# Patient Record
Sex: FEMALE | Race: WHITE | Employment: OTHER | URBAN - METROPOLITAN AREA
[De-identification: names, ages, dates, MRNs, and addresses within clinical notes are randomized per-mention and may not be internally consistent; named-entity substitution may affect disease eponyms.]

---

## 2017-05-02 ENCOUNTER — OFFICE VISIT (OUTPATIENT)
Dept: FAMILY MEDICINE CLINIC | Age: 77
End: 2017-05-02

## 2017-05-02 VITALS
SYSTOLIC BLOOD PRESSURE: 130 MMHG | RESPIRATION RATE: 18 BRPM | HEIGHT: 60 IN | HEART RATE: 64 BPM | WEIGHT: 191.8 LBS | DIASTOLIC BLOOD PRESSURE: 80 MMHG | BODY MASS INDEX: 37.66 KG/M2

## 2017-05-02 DIAGNOSIS — Z00.00 MEDICARE ANNUAL WELLNESS VISIT, SUBSEQUENT: Primary | ICD-10-CM

## 2017-05-02 DIAGNOSIS — N18.30 CHRONIC RENAL FAILURE, STAGE 3 (MODERATE) (HCC): ICD-10-CM

## 2017-05-02 DIAGNOSIS — E21.0 HYPERPARATHYROIDISM, PRIMARY (HCC): ICD-10-CM

## 2017-05-02 DIAGNOSIS — I10 ESSENTIAL HYPERTENSION: ICD-10-CM

## 2017-05-02 PROCEDURE — 99999 PR OFFICE/OUTPT VISIT,PROCEDURE ONLY: CPT | Performed by: INTERNAL MEDICINE

## 2017-05-02 RX ORDER — CINACALCET HYDROCHLORIDE 30 MG/1
1 TABLET, COATED ORAL DAILY
COMMUNITY
Start: 2017-04-05

## 2017-05-02 RX ORDER — NAPROXEN 250 MG/1
250 TABLET ORAL 2 TIMES DAILY WITH MEALS
COMMUNITY

## 2017-05-02 RX ORDER — MULTIVIT-MIN/IRON/FOLIC ACID/K 18-600-40
CAPSULE ORAL
COMMUNITY
End: 2021-04-06

## 2017-05-02 RX ORDER — AMLODIPINE BESYLATE 5 MG/1
5 TABLET ORAL 2 TIMES DAILY
Qty: 60 TABLET | Refills: 5 | Status: SHIPPED | OUTPATIENT
Start: 2017-05-02 | End: 2021-10-08 | Stop reason: SDUPTHER

## 2017-05-02 ASSESSMENT — LIFESTYLE VARIABLES: HOW OFTEN DO YOU HAVE A DRINK CONTAINING ALCOHOL: 0

## 2017-05-02 ASSESSMENT — ENCOUNTER SYMPTOMS
EYES NEGATIVE: 1
GASTROINTESTINAL NEGATIVE: 1
BACK PAIN: 0
RESPIRATORY NEGATIVE: 1
ALLERGIC/IMMUNOLOGIC NEGATIVE: 1

## 2017-05-02 ASSESSMENT — ANXIETY QUESTIONNAIRES: GAD7 TOTAL SCORE: 0

## 2017-05-02 ASSESSMENT — PATIENT HEALTH QUESTIONNAIRE - PHQ9: SUM OF ALL RESPONSES TO PHQ QUESTIONS 1-9: 0

## 2018-09-26 PROBLEM — Z00.00 MEDICARE ANNUAL WELLNESS VISIT, SUBSEQUENT: Status: RESOLVED | Noted: 2017-05-02 | Resolved: 2018-09-26

## 2019-08-13 ENCOUNTER — HOSPITAL ENCOUNTER (OUTPATIENT)
Dept: GENERAL RADIOLOGY | Age: 79
Discharge: HOME | End: 2019-08-13
Payer: COMMERCIAL

## 2019-08-13 DIAGNOSIS — Z78.0 ASYMPTOMATIC POSTMENOPAUSAL STATUS (AGE-RELATED) (NATURAL): ICD-10-CM

## 2019-08-13 PROCEDURE — 77080 DXA BONE DENSITY AXIAL: CPT

## 2020-08-14 ENCOUNTER — TELEPHONE (OUTPATIENT)
Dept: ORTHOPEDIC SURGERY | Age: 80
End: 2020-08-14

## 2020-08-14 NOTE — TELEPHONE ENCOUNTER
08/14/2020  EUFLEXXA  (SERIES OF 3)   BILATERAL KNEES. NO AUTHORIZATION REQUIRED. CAN BUY& BILL. MELODY FOLLOWS MEDICARE GUIDELINES.  AP

## 2020-09-14 ENCOUNTER — OFFICE VISIT (OUTPATIENT)
Dept: ORTHOPEDIC SURGERY | Age: 80
End: 2020-09-14
Payer: COMMERCIAL

## 2020-09-14 VITALS — HEIGHT: 60 IN | BODY MASS INDEX: 37.5 KG/M2 | WEIGHT: 191 LBS

## 2020-09-14 PROCEDURE — 20610 DRAIN/INJ JOINT/BURSA W/O US: CPT | Performed by: ORTHOPAEDIC SURGERY

## 2020-09-14 RX ORDER — HYALURONATE SODIUM 10 MG/ML
40 SYRINGE (ML) INTRAARTICULAR ONCE
Status: COMPLETED | OUTPATIENT
Start: 2020-09-14 | End: 2020-09-14

## 2020-09-14 RX ADMIN — Medication 40 MG: at 15:03

## 2020-09-18 ENCOUNTER — OFFICE VISIT (OUTPATIENT)
Dept: ORTHOPEDIC SURGERY | Age: 80
End: 2020-09-18
Payer: COMMERCIAL

## 2020-09-18 PROCEDURE — 20610 DRAIN/INJ JOINT/BURSA W/O US: CPT | Performed by: ORTHOPAEDIC SURGERY

## 2020-09-18 RX ORDER — HYALURONATE SODIUM 10 MG/ML
40 SYRINGE (ML) INTRAARTICULAR ONCE
Status: COMPLETED | OUTPATIENT
Start: 2020-09-18 | End: 2020-09-18

## 2020-09-18 RX ADMIN — Medication 40 MG: at 11:45

## 2020-10-05 ENCOUNTER — OFFICE VISIT (OUTPATIENT)
Dept: ORTHOPEDIC SURGERY | Age: 80
End: 2020-10-05
Payer: COMMERCIAL

## 2020-10-05 VITALS — BODY MASS INDEX: 37.5 KG/M2 | WEIGHT: 191 LBS | HEIGHT: 60 IN

## 2020-10-05 PROCEDURE — 20610 DRAIN/INJ JOINT/BURSA W/O US: CPT | Performed by: ORTHOPAEDIC SURGERY

## 2020-10-05 RX ORDER — HYALURONATE SODIUM 10 MG/ML
40 SYRINGE (ML) INTRAARTICULAR ONCE
Status: COMPLETED | OUTPATIENT
Start: 2020-10-05 | End: 2020-10-05

## 2020-10-05 RX ADMIN — Medication 40 MG: at 13:16

## 2020-10-05 NOTE — PROGRESS NOTES
The patient returns today for their third Euflexxa injection to the left and right knees for diagnosis of osteoarthritis. . The risks, benefits, and complications of the injections were again discussed in detail with the patient. The risks discussed included but are not limited to infection, skin reactions, hot swollen joints, and anaphylaxis. The patient gave verbal informed consent for the injection. The patient skin was prepped with iodine and sterile 4x4 gauze pad and the left and right knee joints were injected with 2 ml of Euflexxa intra-articularly under sterile conditions  into the supra-lateral pouch. The patient tolerated the injection reasonably well. The patient was given instructions to ice the left and right knee and avoid strenuous activities for 24-48 hours. The patient was instructed to call the office immediately if there is increased pain, redness, warmth, fever, or chills. We will see the patient back in 3 months or as needed for follow up. It was suggested that she might be a candidate for medial  brace and she was going to look into that. She thought was a good idea. We will see if this gets approved by her insurance and then get that worked out with her advanced age medial degenerative joint disease.

## 2020-10-06 ENCOUNTER — TELEPHONE (OUTPATIENT)
Dept: ORTHOPEDIC SURGERY | Age: 80
End: 2020-10-06

## 2020-10-06 NOTE — TELEPHONE ENCOUNTER
10/6/20 DME     NO PRECERT REQUIRED     DEDUCTIBLE: NONE                             CO INSURANCE: 80/20    OOP: $4900 /$239.38 MET               PER DILLAN @ [facility]  REF # [id number]  MP

## 2021-03-05 ENCOUNTER — IMMUNIZATION (OUTPATIENT)
Dept: PRIMARY CARE CLINIC | Age: 81
End: 2021-03-05
Payer: COMMERCIAL

## 2021-03-05 PROCEDURE — 91300 COVID-19, PFIZER VACCINE 30MCG/0.3ML DOSE: CPT | Performed by: FAMILY MEDICINE

## 2021-03-05 PROCEDURE — 0001A COVID-19, PFIZER VACCINE 30MCG/0.3ML DOSE: CPT | Performed by: FAMILY MEDICINE

## 2021-03-26 ENCOUNTER — IMMUNIZATION (OUTPATIENT)
Dept: PRIMARY CARE CLINIC | Age: 81
End: 2021-03-26
Payer: COMMERCIAL

## 2021-03-26 PROCEDURE — 91300 COVID-19, PFIZER VACCINE 30MCG/0.3ML DOSE: CPT | Performed by: FAMILY MEDICINE

## 2021-03-26 PROCEDURE — 0002A COVID-19, PFIZER VACCINE 30MCG/0.3ML DOSE: CPT | Performed by: FAMILY MEDICINE

## 2021-04-06 ENCOUNTER — OFFICE VISIT (OUTPATIENT)
Dept: FAMILY MEDICINE CLINIC | Age: 81
End: 2021-04-06
Payer: COMMERCIAL

## 2021-04-06 VITALS
HEART RATE: 72 BPM | DIASTOLIC BLOOD PRESSURE: 80 MMHG | BODY MASS INDEX: 34.08 KG/M2 | WEIGHT: 173.6 LBS | RESPIRATION RATE: 16 BRPM | SYSTOLIC BLOOD PRESSURE: 152 MMHG | TEMPERATURE: 97.7 F | HEIGHT: 60 IN | OXYGEN SATURATION: 98 %

## 2021-04-06 DIAGNOSIS — M17.0 PRIMARY OSTEOARTHRITIS OF KNEES, BILATERAL: Chronic | ICD-10-CM

## 2021-04-06 DIAGNOSIS — G47.33 OBSTRUCTIVE SLEEP APNEA SYNDROME: ICD-10-CM

## 2021-04-06 DIAGNOSIS — E21.0 HYPERPARATHYROIDISM, PRIMARY (HCC): ICD-10-CM

## 2021-04-06 DIAGNOSIS — I10 ESSENTIAL HYPERTENSION: Primary | ICD-10-CM

## 2021-04-06 DIAGNOSIS — E78.00 PURE HYPERCHOLESTEROLEMIA: ICD-10-CM

## 2021-04-06 DIAGNOSIS — N18.31 CHRONIC RENAL FAILURE, STAGE 3A (HCC): ICD-10-CM

## 2021-04-06 PROCEDURE — 99214 OFFICE O/P EST MOD 30 MIN: CPT | Performed by: INTERNAL MEDICINE

## 2021-04-06 RX ORDER — TORSEMIDE 10 MG/1
10 TABLET ORAL DAILY
COMMUNITY
End: 2021-12-14 | Stop reason: ALTCHOICE

## 2021-04-06 RX ORDER — ZINC 25 MG
TABLET ORAL
COMMUNITY

## 2021-04-06 SDOH — ECONOMIC STABILITY: INCOME INSECURITY: HOW HARD IS IT FOR YOU TO PAY FOR THE VERY BASICS LIKE FOOD, HOUSING, MEDICAL CARE, AND HEATING?: NOT HARD AT ALL

## 2021-04-06 SDOH — ECONOMIC STABILITY: TRANSPORTATION INSECURITY
IN THE PAST 12 MONTHS, HAS THE LACK OF TRANSPORTATION KEPT YOU FROM MEDICAL APPOINTMENTS OR FROM GETTING MEDICATIONS?: NO

## 2021-04-06 ASSESSMENT — ENCOUNTER SYMPTOMS
EYES NEGATIVE: 1
BACK PAIN: 1
ALLERGIC/IMMUNOLOGIC NEGATIVE: 1
GASTROINTESTINAL NEGATIVE: 1
RESPIRATORY NEGATIVE: 1

## 2021-04-06 ASSESSMENT — PATIENT HEALTH QUESTIONNAIRE - PHQ9
SUM OF ALL RESPONSES TO PHQ QUESTIONS 1-9: 0
1. LITTLE INTEREST OR PLEASURE IN DOING THINGS: 0

## 2021-04-06 NOTE — ASSESSMENT & PLAN NOTE
Mammo: 4/29/2016. No further testing  Pap: 7/20/2016. No further exam  Colonoscopy: 2013. rechx 10 yrs. DEXA: 8/2017  Eye: 9/2020  Hearing: passed in office exam  Immunnization: flu shot in Oct/Nov. Rx for Shingrix.    MMSE: na  Get Up and Go: passed  Tob: nonsmoker/never  ETOH: none  Caffeine: moderate am  Cardiac Risk Assessment: 10% 10 yr risk by [place].  Living will:  no  Medical power of : yes

## 2021-04-06 NOTE — PATIENT INSTRUCTIONS
Annual Physical Exam. Shingrix vaccine soon. Obstructive Sleep Apnea Syndrome. Will refer for retesting. Chronic Renal Failure, Stage 3 (Moderate). To Nephrology. Hyperparathyroidism, primary ([address] Stable w/ meds. Recent labs good. Primary Osteoarthritis of Knees, Bilateral. To ORtho as needed. Essential Hypertension. BP high. BP twice a day for 1 week. Call w/ results. Might need to adjust meds. Hyperlipidemia. Will do labs. Mammo: 4/29/2016. No further testing  Pap: 7/20/2016. No further exam  Colonoscopy: 2013. rechx 10 yrs. DEXA: 8/2017  Eye: 9/2020  Hearing: passed in office exam  Immunnization: flu shot in Oct/Nov. Rx for Shingrix. MMSE: na  Get Up and Go: passed  Tob: nonsmoker/never  ETOH: none  Caffeine: moderate am  Cardiac Risk Assessment: 10% 10 yr risk by [place].  Living will:  no  Medical power of : yes    Patient Education        Low Back Pain: Exercises  Introduction  Here are some examples of exercises for you to try. The exercises may be suggested for a condition or for rehabilitation. Start each exercise slowly. Ease off the exercises if you start to have pain. You will be told when to start these exercises and which ones will work best for you. How to do the exercises  Press-up   1. Lie on your stomach, supporting your body with your forearms. 2. Press your elbows down into the floor to raise your upper back. As you do this, relax your stomach muscles and allow your back to arch without using your back muscles. As your press up, do not let your hips or pelvis come off the floor. 3. Hold for 15 to 30 seconds, then relax. 4. Repeat 2 to 4 times. Alternate arm and leg (bird dog) exercise   Do this exercise slowly. Try to keep your body straight at all times, and do not let one hip drop lower than the other. 1. Start on the floor, on your hands and knees. 2. Tighten your belly muscles.   3. Raise one leg off the floor, and hold it straight out behind you. Be careful not to let your hip drop down, because that will twist your trunk. 4. Hold for about 6 seconds, then lower your leg and switch to the other leg. 5. Repeat 8 to 12 times on each leg. 6. Over time, work up to holding for 10 to 30 seconds each time. 7. If you feel stable and secure with your leg raised, try raising the opposite arm straight out in front of you at the same time. Knee-to-chest exercise   1. Lie on your back with your knees bent and your feet flat on the floor. 2. Bring one knee to your chest, keeping the other foot flat on the floor (or keeping the other leg straight, whichever feels better on your lower back). 3. Keep your lower back pressed to the floor. Hold for at least 15 to 30 seconds. 4. Relax, and lower the knee to the starting position. 5. Repeat with the other leg. Repeat 2 to 4 times with each leg. 6. To get more stretch, put your other leg flat on the floor while pulling your knee to your chest.    Curl-ups   1. Lie on the floor on your back with your knees bent at a 90-degree angle. Your feet should be flat on the floor, about 12 inches from your buttocks. 2. Cross your arms over your chest. If this bothers your neck, try putting your hands behind your neck (not your head), with your elbows spread apart. 3. Slowly tighten your belly muscles and raise your shoulder blades off the floor. 4. Keep your head in line with your body, and do not press your chin to your chest.  5. Hold this position for 1 or 2 seconds, then slowly lower yourself back down to the floor. 6. Repeat 8 to 12 times. Pelvic tilt exercise   1. Lie on your back with your knees bent. 2. \"Brace\" your stomach. This means to tighten your muscles by pulling in and imagining your belly button moving toward your spine. You should feel like your back is pressing to the floor and your hips and pelvis are rocking back. 3. Hold for about 6 seconds while you breathe smoothly.   4. Repeat 8 to 12 times. Heel dig bridging   1. Lie on your back with both knees bent and your ankles bent so that only your heels are digging into the floor. Your knees should be bent about 90 degrees. 2. Then push your heels into the floor, squeeze your buttocks, and lift your hips off the floor until your shoulders, hips, and knees are all in a straight line. 3. Hold for about 6 seconds as you continue to breathe normally, and then slowly lower your hips back down to the floor and rest for up to 10 seconds. 4. Do 8 to 12 repetitions. Hamstring stretch in doorway   1. Lie on your back in a doorway, with one leg through the open door. 2. Slide your leg up the wall to straighten your knee. You should feel a gentle stretch down the back of your leg. 3. Hold the stretch for at least 15 to 30 seconds. Do not arch your back, point your toes, or bend either knee. Keep one heel touching the floor and the other heel touching the wall. 4. Repeat with your other leg. 5. Do 2 to 4 times for each leg. Hip flexor stretch   1. Kneel on the floor with one knee bent and one leg behind you. Place your forward knee over your foot. Keep your other knee touching the floor. 2. Slowly push your hips forward until you feel a stretch in the upper thigh of your rear leg. 3. Hold the stretch for at least 15 to 30 seconds. Repeat with your other leg. 4. Do 2 to 4 times on each side. Wall sit   1. Stand with your back 10 to 12 inches away from a wall. 2. Lean into the wall until your back is flat against it. 3. Slowly slide down until your knees are slightly bent, pressing your lower back into the wall. 4. Hold for about 6 seconds, then slide back up the wall. 5. Repeat 8 to 12 times. Follow-up care is a key part of your treatment and safety. Be sure to make and go to all appointments, and call your doctor if you are having problems.  It's also a good idea to know your test results and keep a list of the medicines you take.  Where can you learn more? Go to [email or web address] and sign in to your [employer] account. Enter Q346 in the [employer] box to learn more about \"Low Back Pain: Exercises. \"     If you do not have an account, please click on the \"Sign Up Now\" link. Current as of: November 16, 2020               Content Version: 12.8  © 2006-2021 Healthwise, Incorporated. Care instructions adapted under license by [facility] ([facility]). If you have questions about a medical condition or this instruction, always ask your healthcare professional. [address] any warranty or liability for your use of this information.

## 2021-04-06 NOTE — PROGRESS NOTES
Subjective:      Patient ID: Niki Arredondo is a 68 y.o. female. HPI  Annual physical exam  Mammo: 4/29/2016. No further testing  Pap: 7/20/2016. No further exam  Colonoscopy: 2013. rechx 10 yrs. DEXA: 8/2017  Eye: 9/2020  Hearing: passed in office exam  Immunnization: flu shot in Oct/Nov. Rx for Shingrix. MMSE: na  Get Up and Go: passed  Tob: nonsmoker/never  ETOH: none  Caffeine: moderate am  Cardiac Risk Assessment: 10% 10 yr risk by [place].  Living will:  no  Medical power of : yes    Essential hypertension  No change in meds, no c/o with meds, no chest pain, SOB, palpatations, or syncope. Home bp was good. Hyperlipidemia  Stable diet and no c/o w/ meds. No labs for yrs. Chronic renal failure, stage 3 (moderate)  No edema, SOB, decreased urination, abdomenal pain, anemia, or fatigue. Hyperparathyroidism, primary ([address]  Ca up, PTH pending. DEXA was ok in 4 yrs ago. sensipar. Primary osteoarthritis of knees, bilateral  Had injections(Eflexxa)x 3. Stable w/ meds. Increased pain. Review of Systems   Constitutional: Positive for fatigue. HENT: Negative. Eyes: Negative. Respiratory: Negative. Cardiovascular: Negative. Gastrointestinal: Negative. Endocrine: Negative. Genitourinary: Negative. Musculoskeletal: Positive for arthralgias, back pain, gait problem, joint swelling and myalgias. Skin: Negative. Allergic/Immunologic: Negative. Hematological: Negative. Psychiatric/Behavioral: Negative. Vitals:    04/06/21 1448 04/06/21 1543   BP: (!) 142/70 (!) 152/80   Pulse: 72    Resp: 16    Temp: 97.7 °F (36.5 °C)    SpO2: 98%    Weight: 173 lb 9.6 oz (78.7 kg)    Height: 5' (1.524 m)      Objective:   Physical Exam  Constitutional:       General: She is not in acute distress. Appearance: She is well-developed. She is not ill-appearing, toxic-appearing or diaphoretic. HENT:      Head: Normocephalic and atraumatic.    Eyes: Conjunctiva/sclera: Conjunctivae normal.      Pupils: Pupils are equal, round, and reactive to light. Neck:      Musculoskeletal: Full passive range of motion without pain, normal range of motion and neck supple. Normal range of motion. No edema, erythema, spinous process tenderness or muscular tenderness. Thyroid: No thyroid mass or thyromegaly. Vascular: Normal carotid pulses. No carotid bruit, hepatojugular reflux or JVD. Trachea: Trachea normal. No tracheal deviation. Cardiovascular:      Rate and Rhythm: Normal rate and regular rhythm. No extrasystoles are present. Chest Wall: PMI is not displaced. Pulses: Normal pulses. No decreased pulses. Carotid pulses are 2+ on the right side and 2+ on the left side. Radial pulses are 2+ on the right side and 2+ on the left side. Femoral pulses are 2+ on the right side and 2+ on the left side. Popliteal pulses are 2+ on the right side and 2+ on the left side. Dorsalis pedis pulses are 2+ on the right side and 2+ on the left side. Posterior tibial pulses are 2+ on the right side and 2+ on the left side. Heart sounds: Normal heart sounds, S1 normal and S2 normal. Heart sounds not distant. No murmur. No friction rub. No gallop. No S3 or S4 sounds. Pulmonary:      Effort: Pulmonary effort is normal. No tachypnea, bradypnea, accessory muscle usage or respiratory distress. Breath sounds: Normal breath sounds. No decreased breath sounds, wheezing, rhonchi or rales. Chest:      Chest wall: No tenderness. Musculoskeletal: Normal range of motion. General: No tenderness. Lymphadenopathy:      Cervical: No cervical adenopathy. Skin:     General: Skin is warm and dry. Coloration: Skin is not pale. Findings: No abrasion, erythema or rash. Nails: There is no clubbing. Neurological:      Mental Status: She is alert and oriented to person, place, and time.  She is not

## 2021-05-03 ENCOUNTER — TELEPHONE (OUTPATIENT)
Dept: FAMILY MEDICINE CLINIC | Age: 81
End: 2021-05-03

## 2021-05-03 NOTE — TELEPHONE ENCOUNTER
Patient called to report her BP readings. She purchased a new cuff, she went off caffiene. BP was in 160's during brothers illness and death.  4/24 138/66 am 138/68 pm,   4/25 132/68 am, 4/26 123/61 am  116/67 pm,   4-27 140/67 am,   4-28 133/63 am  115/71 pm,   4-29  115/61,  4/30  125/60,   5-1 138/62 am  128/67 pm,   5-2 122/65,   5/3  140/72 am.   Patient is no longer taking Atenolol and would like it removed from her medication list.  She called the [facility] for sleep apnea, cannot get in until August 16th. She is requesting a return call after consulting with Dr. Alesha Luna.

## 2021-05-06 PROBLEM — Z00.00 ANNUAL PHYSICAL EXAM: Status: RESOLVED | Noted: 2017-05-02 | Resolved: 2021-05-06

## 2021-09-16 ENCOUNTER — OFFICE VISIT (OUTPATIENT)
Dept: PULMONOLOGY | Age: 81
End: 2021-09-16
Payer: COMMERCIAL

## 2021-09-16 ENCOUNTER — TELEPHONE (OUTPATIENT)
Dept: PULMONOLOGY | Age: 81
End: 2021-09-16

## 2021-09-16 VITALS
TEMPERATURE: 97.8 F | WEIGHT: 160 LBS | SYSTOLIC BLOOD PRESSURE: 140 MMHG | HEART RATE: 77 BPM | HEIGHT: 62 IN | RESPIRATION RATE: 18 BRPM | BODY MASS INDEX: 29.44 KG/M2 | DIASTOLIC BLOOD PRESSURE: 72 MMHG | OXYGEN SATURATION: 97 %

## 2021-09-16 DIAGNOSIS — R06.81 WITNESSED EPISODE OF APNEA: ICD-10-CM

## 2021-09-16 DIAGNOSIS — R06.83 SNORING: ICD-10-CM

## 2021-09-16 DIAGNOSIS — G47.33 MODERATE OBSTRUCTIVE SLEEP APNEA: Primary | ICD-10-CM

## 2021-09-16 DIAGNOSIS — I10 ESSENTIAL HYPERTENSION: ICD-10-CM

## 2021-09-16 PROCEDURE — 99214 OFFICE O/P EST MOD 30 MIN: CPT | Performed by: INTERNAL MEDICINE

## 2021-09-16 ASSESSMENT — SLEEP AND FATIGUE QUESTIONNAIRES
HOW LIKELY ARE YOU TO NOD OFF OR FALL ASLEEP WHEN YOU ARE A PASSENGER IN A CAR FOR AN HOUR WITHOUT A BREAK: 0
ESS TOTAL SCORE: 0
HOW LIKELY ARE YOU TO NOD OFF OR FALL ASLEEP WHILE LYING DOWN TO REST IN THE AFTERNOON WHEN CIRCUMSTANCES PERMIT: 0
HOW LIKELY ARE YOU TO NOD OFF OR FALL ASLEEP WHILE SITTING INACTIVE IN A PUBLIC PLACE: 0
HOW LIKELY ARE YOU TO NOD OFF OR FALL ASLEEP WHILE WATCHING TV: 0
HOW LIKELY ARE YOU TO NOD OFF OR FALL ASLEEP WHILE SITTING AND READING: 0
NECK CIRCUMFERENCE (INCHES): 14
HOW LIKELY ARE YOU TO NOD OFF OR FALL ASLEEP WHILE SITTING AND TALKING TO SOMEONE: 0
HOW LIKELY ARE YOU TO NOD OFF OR FALL ASLEEP IN A CAR, WHILE STOPPED FOR A FEW MINUTES IN TRAFFIC: 0
HOW LIKELY ARE YOU TO NOD OFF OR FALL ASLEEP WHILE SITTING QUIETLY AFTER LUNCH WITHOUT ALCOHOL: 0

## 2021-09-16 NOTE — PROGRESS NOTES
[facility]                                                                  CHIEF COMPLAINT: HOANG     Consulting provider: Nataliia Canales MD      HPI:   Patient was diagnosed with moderate HOANG 12/18/13. Associated with snoring, witnessed apnea, and hypersomnia. Did not want to get CPAP at that time. Better with her chin strap and when sitting up - 75 degrees due to her back pain. + dry mouth upon awakening. Feels overall better and wondering if she still has it. Interesting in knowing whether she has HOANG or not, if she has might consider CPAP therapy. Bedtime 11-11:30 pm and rise time is 7-7:30 am. Sleep onset 20 minutes. No naps. Old records were reviewed and summarized by me. Past Medical History:   Diagnosis Date    Asthma     Diverticulosis     DJD (degenerative joint disease)     thoracic, lumbar    Hyperlipidemia     [facility]par[facility]roidism, unspecified ([facility]     Hypertension     Osteopenia     stopped fosamax 11/2014 after being on for 10years    Pneumonia     Renal artery stenosis (HCC)     Sleep apnea     Spina bifida [facility])        Past Surgical History:        Procedure Laterality Date    ANGIOPLASTY      rt renal artery stenosis    BREAST REDUCTION SURGERY  1997    bilateral    COLONOSCOPY  11/18/2008    COLONOSCOPY  5/14/13    bx    DILATION AND CURETTAGE OF UTERUS         Allergies:  is allergic to adhesive tape. Social History:    TOBACCO:   reports that she has never smoked. She has never used smokeless tobacco.  ETOH:   reports no history of alcohol use.       Family History:       Problem Relation Age of Onset   Melissa Solum Cancer Mother 80        duodenal    Heart Disease Father 78    Hypertension Father     Hypertension Brother     Diabetes Brother     Cancer Brother         type 2    Hypertension Brother     Diabetes Brother     Hypertension Brother     Heart Disease Other         AAA    Asthma Neg Hx  Emphysema Neg Hx        Current Medications:    Current Outpatient Medications:     torsemide (DEMADEX) 10 MG tablet, Take 10 mg by mouth daily, Disp: , Rfl:     Zinc 25 MG TABS, Take by mouth, Disp: , Rfl:     Glucosamine-Chondroit-Vit C-Mn (GLUCOSAMINE CHONDR 1500 COMPLX PO), Take by mouth, Disp: , Rfl:     naproxen (NAPROSYN) 250 MG tablet, Take 250 mg by mouth 2 times daily (with meals), Disp: , Rfl:     SENSIPAR 30 MG tablet, Take 1 tablet by mouth daily, Disp: , Rfl:     amLODIPine (NORVASC) 5 MG tablet, Take 1 tablet by mouth 2 times daily, Disp: 60 tablet, Rfl: 5    Melatonin 5 MG TABS tablet, Take  by mouth., Disp: , Rfl:     enalapril (VASOTEC) 10 MG tablet, Take 2 tablets by mouth daily. Takes 10 mg in morning, 5 mg in pm, Disp: 30 tablet, Rfl:     vitamin E 1000 UNITS capsule, Take 4,000 Units by mouth daily. , Disp: , Rfl:     Ascorbic Acid (VITAMIN C) 500 MG tablet, Take 500 mg by mouth daily. , Disp: , Rfl:     FISH OIL, by Does not apply route., Disp: , Rfl:     aspirin 81 MG EC tablet, Take 81 mg by mouth every 48 hours. , Disp: , Rfl:     BL GLUCOSAMINE-CHONDROITIN PO, Take  by mouth 2 times daily. , Disp: , Rfl:     simvastatin (ZOCOR) 20 MG tablet, Take 20 mg by mouth nightly.  , Disp: , Rfl:       REVIEW OF SYSTEMS:  Constitutional: Negative for fever  HENT: Negative for sore throat  Eyes: Negative for redness   Respiratory: Negative for dyspnea, cough  Cardiovascular: Negative for chest pain  Gastrointestinal: Negative for vomiting, diarrhea   Genitourinary: Negative for hematuria   Musculoskeletal: + arthralgias   Skin: Negative for rash  Neurological: Negative for syncope  Hematological: Negative for adenopathy  Psychiatric/Behavorial: Negative for anxiety      Objective:   PHYSICAL EXAM:    Blood pressure (!) 140/72, pulse 77, temperature 97.8 °F (36.6 °C), resp. rate 18, height 5' 2\" (1.575 m), weight 160 lb (72.6 kg), SpO2 97 %.' on RA  Gen: No distress.  BMI of 29.26  Eyes: PERRL. No sclera icterus. No conjunctival injection. ENT: No discharge. Pharynx clear. Mallampati class IV. Neck: Trachea midline. No obvious mass. Neck circumference 14\"  Resp: No accessory muscle use. No crackles. No wheezes. No rhonchi. No dullness on percussion. Good air entry. CV: Regular rate. Regular rhythm. No murmur or rub. No edema. GI: Non-tender. Non-distended. No hernia. Skin: Warm and dry. No nodule on exposed extremities. Lymph: No cervical LAD. No supraclavicular LAD. M/S: No cyanosis. No joint deformity. No clubbing. Neuro: Awake. Alert. Moves all four extremities. Psych: Oriented x 3. No anxiety. DATA reviewed by me:   HST 12/18/2013 AHI 19. Desaturation to 82%      Assessment:       · Moderate HOANG  · Snoring and witnsessed apnea   · Obesity class  · Essential hypertension      Plan:       Repeat HST evaluate for sleep related breathing disorder. Treatment options were discussed with patient if HST reveals HOANG, including CPAP therapy, oral appliances, upper airway surgery and hypoglossal nerve stimulation. Discussed with patient the pathophysiology of apnea. Sleep hygiene  Avoid sedatives, alcohol and caffeinated drinks at bed time. No driving motorized vehicles or operating heavy machinery while fatigue, drowsy or sleepy. Continue blood pressure medications - treatment of HOANG can lower blood pressure by levels that are clinically significant. Weight loss is also recommended as a long-term intervention. Complications of HOANG if not treated were discussed with patient patient to include systemic hypertension, pulmonary hypertension, cardiovascular morbidities, car accidents and all cause mortality.

## 2021-09-16 NOTE — PATIENT INSTRUCTIONS
Remember to bring all pulmonary medications to your next appointment with the office. Please keep all of your future appointments scheduled by [address], [facility] office. Out of respect for other patients and providers, you may be asked to reschedule your appointment if you arrive later than your scheduled appointment time. Appointments cancelled less than 24hrs in advance will be considered a no show. Patients with three missed appointments within 1 year or four missed appointments within 2 years can be dismissed from the practice. Sleep Hygiene. .. Tips for better sleep. .. Avoid naps. This will ensure you are sleepy at bedtime. If you have to take a nap, sleep less than 1 hour, before 3 pm.  Sleep only when sleepy; this reduces the time you are awake in bed. Regular exercise is recommended to help you deepen your sleep, but not within 4-6 hours of your bedtime. Timing of exercise is important, aim to exercise early in the morning or early afternoon. A light snack may help you fall asleep. Warm milk and foods high in the amino acid tryptophan, such as bananas, may help you to sleep  Be sure to avoid heavy, spicy or sugary foods 4-6 hours before bedtime and avoid at snack time. Stay away from stimulants such as caffeine and nicotine for at least 4-6 hours before bed. Stimulants can interfere with your ability to fall asleep. Caffeine is found in tea, cola, coffee, cocoa and chocolate and is best avoided at bedtime. Nicotine is found in tobacco products. Avoid alcohol 4-6 hours before bedtime. Alcohol has an immediate sleep-inducing effect, after a few hours when alcohol levels fall there is a stimulant or wake-up effect and will cause fragmented sleep. Sleep rituals are important. Give your body clues it is time to slow down and sleep. Examples include; yoga, deep breathing, listen to relaxing music, a hot bath or a few minutes of reading.   Have a fixed bedtime and awakening time, Even on weekends! You will feel better keeping a regular sleep cycle, even if you are retired or not working. Get into your favorite sleep position. If not asleep in 30 minutes, get up and do something boring until you feel sleepy. Remember not to expose yourself to bright lights such as TV, phone or tablet screens. Only use your bed for sleeping. Do not use your bed as an office, workroom or recreation room. Use comfortable bedding. Uncomfortable bedding can prevent good sleep. Ensure your bedroom is quiet and comfortable. A cooler room along with enough blankets to stay warm is recommended. If your room is too noisy, try a white noise machine. If too bright, try black out shades or an eye mask. Dont take worries to bed. Leave worries about work, school etc. behind you when you go to bed. Some people find it helpful to assign a worry period in the evening or late afternoon to write down your worries and get them out of your system. Your home sleep test is scheduled to be picked up at the Sleep center located at [address] [place] [address] on  at  . Address to Sleep Center: [facility] at [address] [place] [address], [address] [place] [address]            Phone: [phone] Fax: [phone]    If you should need to cancel or reschedule your appointment, please call the Sleep Center at [phone] as soon as possible. We ask that you please phone the [facility] Patient Pre-Services ([phone]) at least 3-5 days prior to your sleep study to pre-register. Failing to pre-register may ultimately cause your insurance to not pay for this procedure. Please refrain from or reduce the use of caffeine and/or alcohol prior to your sleep study and avoid napping the day of your study as these will affect the accuracy of your test results.

## 2021-09-16 NOTE — TELEPHONE ENCOUNTER
Tried scheduling 31-90 day appt for patient per her AVS summary. Patient declined to schedule at this time, states she will schedule her fua after completing HST.

## 2021-10-08 ENCOUNTER — OFFICE VISIT (OUTPATIENT)
Dept: FAMILY MEDICINE CLINIC | Age: 81
End: 2021-10-08
Payer: COMMERCIAL

## 2021-10-08 ENCOUNTER — HOSPITAL ENCOUNTER (OUTPATIENT)
Dept: GENERAL RADIOLOGY | Age: 81
Discharge: HOME | End: 2021-10-08
Payer: COMMERCIAL

## 2021-10-08 VITALS
RESPIRATION RATE: 16 BRPM | BODY MASS INDEX: 28.63 KG/M2 | HEART RATE: 62 BPM | OXYGEN SATURATION: 98 % | SYSTOLIC BLOOD PRESSURE: 148 MMHG | HEIGHT: 62 IN | WEIGHT: 155.6 LBS | DIASTOLIC BLOOD PRESSURE: 86 MMHG

## 2021-10-08 DIAGNOSIS — M54.6 THORACIC SPINE PAIN: ICD-10-CM

## 2021-10-08 DIAGNOSIS — M54.6 ACUTE MIDLINE THORACIC BACK PAIN: ICD-10-CM

## 2021-10-08 DIAGNOSIS — G47.33 OBSTRUCTIVE SLEEP APNEA SYNDROME: ICD-10-CM

## 2021-10-08 DIAGNOSIS — M54.6 THORACIC SPINE PAIN: Primary | ICD-10-CM

## 2021-10-08 DIAGNOSIS — I10 ESSENTIAL HYPERTENSION: ICD-10-CM

## 2021-10-08 PROCEDURE — 72070 X-RAY EXAM THORAC SPINE 2VWS: CPT

## 2021-10-08 PROCEDURE — 90694 VACC AIIV4 NO PRSRV 0.5ML IM: CPT | Performed by: INTERNAL MEDICINE

## 2021-10-08 PROCEDURE — 99214 OFFICE O/P EST MOD 30 MIN: CPT | Performed by: INTERNAL MEDICINE

## 2021-10-08 PROCEDURE — G0008 ADMIN INFLUENZA VIRUS VAC: HCPCS | Performed by: INTERNAL MEDICINE

## 2021-10-08 RX ORDER — DULOXETIN HYDROCHLORIDE 30 MG/1
30 CAPSULE, DELAYED RELEASE ORAL DAILY
Qty: 30 CAPSULE | Refills: 3 | Status: SHIPPED | OUTPATIENT
Start: 2021-10-08 | End: 2022-07-07

## 2021-10-08 RX ORDER — AMLODIPINE BESYLATE 5 MG/1
5 TABLET ORAL 2 TIMES DAILY
Qty: 60 TABLET | Refills: 5 | Status: SHIPPED | OUTPATIENT
Start: 2021-10-08 | End: 2021-10-14 | Stop reason: SDUPTHER

## 2021-10-08 ASSESSMENT — ENCOUNTER SYMPTOMS
BACK PAIN: 1
RESPIRATORY NEGATIVE: 1
ALLERGIC/IMMUNOLOGIC NEGATIVE: 1
GASTROINTESTINAL NEGATIVE: 1

## 2021-10-08 NOTE — PATIENT INSTRUCTIONS
A/P:     Acute Midline Thoracic Back Pain. Will do X-ray. Local ice and Aleve. Obstructive Sleep Apnea Syndrome. To do test.     Essential Hypertension. Home BP. Call if > 140/90 after taking anxiety meds for 3 days. Daily BP checks for at least 1 week. Anxiety/Depression. Begin Cymbalta 30 mg once daily. Call if new c/o.             Sam Gibbons MD

## 2021-10-08 NOTE — PROGRESS NOTES
Gosia Xie presents today for   Chief Complaint   Patient presents with    Back Pain    Anxiety        Essential hypertension  No change in meds, no c/o with meds, no chest pain, SOB, palpatations, or syncope. Home bp have been high in 150s. Obstructive sleep apnea syndrome  Tested 2013. Has not treated. Saw Pulmonary recent but hasn't done testing. Acute midline thoracic back pain   Acute pain started ~2 months ago after lifting . Pain right in middle of back. Not much worse w/ movement. Pain 7/10 comes and goes. Hx of Osteopenia. Review of Systems   Constitutional: Positive for fatigue. Respiratory: Negative. Cardiovascular: Negative. Gastrointestinal: Negative. Endocrine: Negative. Genitourinary: Negative. Musculoskeletal: Positive for back pain. Skin: Negative. Allergic/Immunologic: Negative. Neurological: Negative. Hematological: Negative. Vitals:    10/08/21 1528 10/08/21 1624   BP: (!) 152/84 (!) 148/86   Pulse: 62    Resp: 16    SpO2: 98%    Weight: 155 lb 9.6 oz (70.6 kg)    Height: 5' 2\" (1.575 m)          Physical Exam  Constitutional:       General: She is in acute distress. Appearance: She is well-developed. She is not ill-appearing, toxic-appearing or diaphoretic. HENT:      Head: Normocephalic and atraumatic. Eyes:      Extraocular Movements: Extraocular movements intact. Conjunctiva/sclera: Conjunctivae normal.      Pupils: Pupils are equal, round, and reactive to light. Neck:      Thyroid: No thyroid mass or thyromegaly. Vascular: Normal carotid pulses. No carotid bruit, hepatojugular reflux or JVD. Trachea: Trachea normal. No tracheal deviation. Cardiovascular:      Rate and Rhythm: Normal rate and regular rhythm. No extrasystoles are present. Chest Wall: PMI is not displaced. Pulses: Normal pulses. No decreased pulses. Carotid pulses are 2+ on the right side and 2+ on the left side. Radial pulses are 2+ on the right side and 2+ on the left side. Femoral pulses are 2+ on the right side and 2+ on the left side. Popliteal pulses are 2+ on the right side and 2+ on the left side. Dorsalis pedis pulses are 2+ on the right side and 2+ on the left side. Posterior tibial pulses are 2+ on the right side and 2+ on the left side. Heart sounds: Normal heart sounds, S1 normal and S2 normal. Heart sounds not distant. No murmur heard. No friction rub. No gallop. No S3 or S4 sounds. Pulmonary:      Effort: Pulmonary effort is normal. No tachypnea, bradypnea, accessory muscle usage or respiratory distress. Breath sounds: Normal breath sounds. No decreased breath sounds, wheezing, rhonchi or rales. Chest:      Chest wall: No tenderness. Musculoskeletal:         General: Tenderness (palp pain) present. Normal range of motion. Cervical back: Full passive range of motion without pain, normal range of motion and neck supple. No edema, erythema, rigidity or tenderness. No spinous process tenderness or muscular tenderness. Normal range of motion. Right lower leg: No edema. Left lower leg: No edema. Lymphadenopathy:      Cervical: No cervical adenopathy. Skin:     General: Skin is warm and dry. Coloration: Skin is not jaundiced or pale. Findings: No abrasion, bruising, erythema, lesion or rash. Nails: There is no clubbing. Neurological:      General: No focal deficit present. Mental Status: She is alert and oriented to person, place, and time. Mental status is at baseline. She is not disoriented. Cranial Nerves: No cranial nerve deficit. Sensory: No sensory deficit. Motor: No weakness, tremor, atrophy or abnormal muscle tone. Coordination: Coordination normal.      Gait: Gait normal.      Deep Tendon Reflexes: Reflexes are normal and symmetric.    Psychiatric:         Mood and Affect: Mood is anxious and depressed. Speech: Speech normal.         Behavior: Behavior normal.         Thought Content: Thought content normal.         Judgment: Judgment normal.          A/P:     Acute Midline Thoracic Back Pain. Will do X-ray. Local ice and Aleve. Obstructive Sleep Apnea Syndrome. To do test.     Essential Hypertension. Home BP. Call if > 140/90 after taking anxiety meds for 3 days. Daily BP checks for at least 1 week. Anxiety/Depression. Begin Cymbalta 30 mg once daily. Call if new c/o.             Tawny Walker MD

## 2021-10-08 NOTE — PROGRESS NOTES
Vaccine Information Sheet, \"Influenza - Inactivated\"  given to Juan José Perez, or parent/legal guardian of  Juan José Perez and verbalized understanding. Patient responses:    Have you ever had a reaction to a flu vaccine? No  Do you have any current illness? No  Have you ever had [place] Syndrome? No  Do you have a serious allergy to any of the follow: Neomycin, Polymyxin, Thimerosal, eggs or egg products? No    Flu vaccine given per order. Please see immunization tab. Risks and benefits explained. Current VIS given.

## 2021-10-08 NOTE — ASSESSMENT & PLAN NOTE
No change in meds, no c/o with meds, no chest pain, SOB, palpatations, or syncope. Home bp have been high in 150s.

## 2021-10-08 NOTE — ASSESSMENT & PLAN NOTE
Acute pain started ~2 months ago after lifting . Pain right in middle of back. Not much worse w/ movement. Pain 7/10 comes and goes. Hx of Osteopenia.

## 2021-10-13 ENCOUNTER — TELEPHONE (OUTPATIENT)
Dept: FAMILY MEDICINE CLINIC | Age: 81
End: 2021-10-13

## 2021-10-13 NOTE — TELEPHONE ENCOUNTER
Patient said she was advised to call if her BP was over 140/90 for 3 days on anxiety medication. Patient said on Monday her BP was 150/75, Tuesday 160/80, and Wednesday 148/81. Please advise what patient should do next. I did inform her Dr. Torres Shimon was out of the office today.

## 2021-10-14 RX ORDER — AMLODIPINE BESYLATE 10 MG/1
10 TABLET ORAL 2 TIMES DAILY
Qty: 60 TABLET | Refills: 5 | Status: SHIPPED
Start: 2021-10-14 | End: 2021-12-14 | Stop reason: DRUGHIGH

## 2021-11-29 ENCOUNTER — HOSPITAL ENCOUNTER (OUTPATIENT)
Dept: SLEEP CENTER | Age: 81
Discharge: HOME | End: 2021-12-01
Payer: COMMERCIAL

## 2021-11-29 DIAGNOSIS — G47.33 MODERATE OBSTRUCTIVE SLEEP APNEA: ICD-10-CM

## 2021-11-29 DIAGNOSIS — R06.83 SNORING: ICD-10-CM

## 2021-11-29 DIAGNOSIS — R06.81 WITNESSED EPISODE OF APNEA: ICD-10-CM

## 2021-11-29 PROCEDURE — 95806 SLEEP STUDY UNATT&RESP EFFT: CPT

## 2021-12-01 PROCEDURE — 95806 SLEEP STUDY UNATT&RESP EFFT: CPT | Performed by: INTERNAL MEDICINE

## 2021-12-02 ENCOUNTER — TELEPHONE (OUTPATIENT)
Dept: PULMONOLOGY | Age: 81
End: 2021-12-02

## 2021-12-02 DIAGNOSIS — G47.33 OSA (OBSTRUCTIVE SLEEP APNEA): Primary | ICD-10-CM

## 2021-12-14 ENCOUNTER — OFFICE VISIT (OUTPATIENT)
Dept: FAMILY MEDICINE CLINIC | Age: 81
End: 2021-12-14
Payer: COMMERCIAL

## 2021-12-14 VITALS
BODY MASS INDEX: 27.31 KG/M2 | OXYGEN SATURATION: 98 % | SYSTOLIC BLOOD PRESSURE: 126 MMHG | RESPIRATION RATE: 16 BRPM | WEIGHT: 148.4 LBS | HEART RATE: 74 BPM | DIASTOLIC BLOOD PRESSURE: 64 MMHG | HEIGHT: 62 IN

## 2021-12-14 DIAGNOSIS — E78.00 PURE HYPERCHOLESTEROLEMIA: ICD-10-CM

## 2021-12-14 DIAGNOSIS — N18.31 CHRONIC RENAL FAILURE, STAGE 3A (HCC): ICD-10-CM

## 2021-12-14 DIAGNOSIS — M17.0 PRIMARY OSTEOARTHRITIS OF KNEES, BILATERAL: Chronic | ICD-10-CM

## 2021-12-14 DIAGNOSIS — M54.2 NECK PAIN: ICD-10-CM

## 2021-12-14 DIAGNOSIS — R10.31 CHRONIC RLQ PAIN: ICD-10-CM

## 2021-12-14 DIAGNOSIS — I10 ESSENTIAL HYPERTENSION: ICD-10-CM

## 2021-12-14 DIAGNOSIS — G89.29 CHRONIC RLQ PAIN: ICD-10-CM

## 2021-12-14 PROCEDURE — 99214 OFFICE O/P EST MOD 30 MIN: CPT | Performed by: INTERNAL MEDICINE

## 2021-12-14 RX ORDER — AMLODIPINE BESYLATE 5 MG/1
5 TABLET ORAL 2 TIMES DAILY
Qty: 180 TABLET | Refills: 3 | Status: SHIPPED | OUTPATIENT
Start: 2021-12-14

## 2021-12-14 ASSESSMENT — ENCOUNTER SYMPTOMS
EYES NEGATIVE: 1
ALLERGIC/IMMUNOLOGIC NEGATIVE: 1
RESPIRATORY NEGATIVE: 1
GASTROINTESTINAL NEGATIVE: 1

## 2021-12-14 NOTE — PATIENT INSTRUCTIONS
A/P:     Neck Pain. Trapezius spasm, ice and Voltaren gel. To get X-ray if no better. Chronic Rlq Pain. No palp apin or mass. Fiber and fluids. Primary Osteoarthritis of Knees, Bilateral. Voltaren gel. Essential Hypertension. Continue present meds. Home BP checks. Call if>140/90. Improve diet. Avoid caffeine and salt. Call if new c/o w/ meds. Pure Hypercholesterolemia. Lost 40+ lbs. Eating much better. Chronic Renal Failure, Stage 3 (Moderate) (Hcc). To Nephrologist as scheduled.          Juan Mclaughlin MD

## 2021-12-14 NOTE — PROGRESS NOTES
Rashid Kirkland presents today for   Chief Complaint   Patient presents with    Hypertension        Essential hypertension  No change in meds, no c/o with meds, no chest pain, SOB, palpatations, or syncope. Home bp was high, couldn't take 10 mg amlodipine so tolerated 5 mg twice daily. Has gotten better. Pure hypercholesterolemia  Stable diet and no c/o w/ meds. No labs for yrs. Chronic renal failure, stage 3 (moderate)  No edema, SOB, decreased urination, abdomenal pain, anemia, or fatigue. Sees Nephrology in Feb.     Neck pain   Pain since AA 6 yrs ago. Slowly worse over last month. Muscle pain. Chronic RLQ pain   2 months. Pain comes and goes. Some worse after BM. Can occur at anytime. Not related to eating. Primary osteoarthritis of knees, bilateral   Worse w/ weather. Review of Systems   Constitutional: Negative. HENT: Negative. Eyes: Negative. Respiratory: Negative. Cardiovascular: Negative. Gastrointestinal: Negative. Endocrine: Negative. Genitourinary: Negative. Musculoskeletal: Negative. Skin: Negative. Allergic/Immunologic: Negative. Neurological: Negative. Hematological: Negative. Psychiatric/Behavioral: Negative. Vitals:    12/14/21 1432   BP: 126/64   Pulse: 74   Resp: 16   SpO2: 98%   Weight: 148 lb 6.4 oz (67.3 kg)   Height: 5' 2\" (1.575 m)        Physical Exam  Constitutional:       General: She is not in acute distress. Appearance: Normal appearance. She is well-developed. She is obese. She is not ill-appearing, toxic-appearing or diaphoretic. HENT:      Head: Normocephalic and atraumatic. Eyes:      Extraocular Movements: Extraocular movements intact. Conjunctiva/sclera: Conjunctivae normal.      Pupils: Pupils are equal, round, and reactive to light. Neck:      Thyroid: No thyroid mass or thyromegaly. Vascular: Normal carotid pulses. No carotid bruit, hepatojugular reflux or JVD.       Trachea: Trachea normal. No tracheal deviation. Cardiovascular:      Rate and Rhythm: Normal rate and regular rhythm. No extrasystoles are present. Chest Wall: PMI is not displaced. Pulses: Normal pulses. No decreased pulses. Carotid pulses are 2+ on the right side and 2+ on the left side. Radial pulses are 2+ on the right side and 2+ on the left side. Femoral pulses are 2+ on the right side and 2+ on the left side. Popliteal pulses are 2+ on the right side and 2+ on the left side. Dorsalis pedis pulses are 2+ on the right side and 2+ on the left side. Posterior tibial pulses are 2+ on the right side and 2+ on the left side. Heart sounds: Normal heart sounds, S1 normal and S2 normal. Heart sounds not distant. No murmur heard. No friction rub. No gallop. No S3 or S4 sounds. Pulmonary:      Effort: Pulmonary effort is normal. No tachypnea, bradypnea, accessory muscle usage or respiratory distress. Breath sounds: Normal breath sounds. No decreased breath sounds, wheezing, rhonchi or rales. Chest:      Chest wall: No tenderness. Musculoskeletal:         General: Tenderness (Trapezius spasm and OA neck moderate. palp pain and Tendonitis  L shoulder. ) present. No swelling, deformity or signs of injury. Cervical back: Full passive range of motion without pain, normal range of motion and neck supple. No edema, erythema, rigidity or tenderness. No spinous process tenderness or muscular tenderness. Normal range of motion. Right lower leg: No edema. Left lower leg: No edema. Lymphadenopathy:      Cervical: No cervical adenopathy. Skin:     General: Skin is warm and dry. Coloration: Skin is not jaundiced or pale. Findings: No abrasion, bruising, erythema, lesion or rash. Nails: There is no clubbing. Neurological:      General: No focal deficit present. Mental Status: She is alert and oriented to person, place, and time.  Mental status is at baseline. She is not disoriented. Cranial Nerves: No cranial nerve deficit. Sensory: No sensory deficit. Motor: No weakness, tremor, atrophy or abnormal muscle tone. Coordination: Coordination normal.      Gait: Gait normal.      Deep Tendon Reflexes: Reflexes are normal and symmetric. Psychiatric:         Mood and Affect: Mood normal.         Speech: Speech normal.         Behavior: Behavior normal.         Thought Content: Thought content normal.         Judgment: Judgment normal.          A/P:     Neck Pain. Trapezius spasm, ice and Voltaren gel. To get X-ray if no better. Chronic Rlq Pain. No palp apin or mass. Fiber and fluids. Primary Osteoarthritis of Knees, Bilateral. Voltaren gel. Essential Hypertension. Continue present meds. Home BP checks. Call if>140/90. Improve diet. Avoid caffeine and salt. Call if new c/o w/ meds. Pure Hypercholesterolemia. Lost 40+ lbs. Eating much better. Chronic Renal Failure, Stage 3 (Moderate) (Hcc). To Nephrologist as scheduled.          Emmanuel Plasencia MD

## 2021-12-14 NOTE — ASSESSMENT & PLAN NOTE
No change in meds, no c/o with meds, no chest pain, SOB, palpatations, or syncope. Home bp was high, couldn't take 10 mg amlodipine so tolerated 5 mg twice daily. Has gotten better.

## 2021-12-16 ENCOUNTER — HOSPITAL ENCOUNTER (OUTPATIENT)
Dept: SLEEP CENTER | Age: 81
Discharge: HOME | End: 2021-12-18
Payer: COMMERCIAL

## 2021-12-16 DIAGNOSIS — G47.33 OSA (OBSTRUCTIVE SLEEP APNEA): ICD-10-CM

## 2021-12-16 PROCEDURE — 95811 POLYSOM 6/>YRS CPAP 4/> PARM: CPT

## 2021-12-17 ENCOUNTER — HOSPITAL ENCOUNTER (EMERGENCY)
Age: 81
Discharge: HOME | End: 2021-12-17
Attending: EMERGENCY MEDICINE
Payer: COMMERCIAL

## 2021-12-17 ENCOUNTER — APPOINTMENT (OUTPATIENT)
Dept: GENERAL RADIOLOGY | Age: 81
End: 2021-12-17
Payer: COMMERCIAL

## 2021-12-17 VITALS
SYSTOLIC BLOOD PRESSURE: 144 MMHG | HEART RATE: 64 BPM | RESPIRATION RATE: 16 BRPM | HEIGHT: 62 IN | OXYGEN SATURATION: 95 % | BODY MASS INDEX: 27.23 KG/M2 | TEMPERATURE: 98.2 F | DIASTOLIC BLOOD PRESSURE: 70 MMHG | WEIGHT: 148 LBS

## 2021-12-17 DIAGNOSIS — I10 ESSENTIAL HYPERTENSION: ICD-10-CM

## 2021-12-17 DIAGNOSIS — R07.9 CHEST PAIN, UNSPECIFIED TYPE: Primary | ICD-10-CM

## 2021-12-17 LAB
A/G RATIO: 1.9 (ref 1.1–2.2)
ALBUMIN SERPL-MCNC: 4.3 G/DL (ref 3.4–5)
ALP BLD-CCNC: 93 U/L (ref 40–129)
ALT SERPL-CCNC: 34 U/L (ref 10–40)
ANION GAP SERPL CALCULATED.3IONS-SCNC: 9 MMOL/L (ref 3–16)
AST SERPL-CCNC: 32 U/L (ref 15–37)
BASOPHILS ABSOLUTE: 0.1 K/UL (ref 0–0.2)
BASOPHILS RELATIVE PERCENT: 1.3 %
BILIRUB SERPL-MCNC: 0.6 MG/DL (ref 0–1)
BUN BLDV-MCNC: 16 MG/DL (ref 7–20)
CALCIUM SERPL-MCNC: 10.6 MG/DL (ref 8.3–10.6)
CHLORIDE BLD-SCNC: 106 MMOL/L (ref 99–110)
CO2: 26 MMOL/L (ref 21–32)
CREAT SERPL-MCNC: 0.9 MG/DL (ref 0.6–1.2)
EKG ATRIAL RATE: 80 BPM
EKG DIAGNOSIS: NORMAL
EKG P AXIS: 39 DEGREES
EKG P-R INTERVAL: 148 MS
EKG Q-T INTERVAL: 364 MS
EKG QRS DURATION: 96 MS
EKG QTC CALCULATION (BAZETT): 419 MS
EKG R AXIS: -9 DEGREES
EKG T AXIS: 43 DEGREES
EKG VENTRICULAR RATE: 80 BPM
EOSINOPHILS ABSOLUTE: 0.4 K/UL (ref 0–0.6)
EOSINOPHILS RELATIVE PERCENT: 6.4 %
GFR AFRICAN AMERICAN: >60
GFR NON-AFRICAN AMERICAN: >60
GLUCOSE BLD-MCNC: 87 MG/DL (ref 70–99)
HCT VFR BLD CALC: 37.6 % (ref 36–48)
HEMOGLOBIN: 12.7 G/DL (ref 12–16)
LYMPHOCYTES ABSOLUTE: 1.3 K/UL (ref 1–5.1)
LYMPHOCYTES RELATIVE PERCENT: 23 %
MCH RBC QN AUTO: 32.2 PG (ref 26–34)
MCHC RBC AUTO-ENTMCNC: 33.7 G/DL (ref 31–36)
MCV RBC AUTO: 95.6 FL (ref 80–100)
MONOCYTES ABSOLUTE: 0.6 K/UL (ref 0–1.3)
MONOCYTES RELATIVE PERCENT: 11.1 %
NEUTROPHILS ABSOLUTE: 3.4 K/UL (ref 1.7–7.7)
NEUTROPHILS RELATIVE PERCENT: 58.2 %
PDW BLD-RTO: 13 % (ref 12.4–15.4)
PLATELET # BLD: 198 K/UL (ref 135–450)
PMV BLD AUTO: 8.2 FL (ref 5–10.5)
POTASSIUM REFLEX MAGNESIUM: 3.9 MMOL/L (ref 3.5–5.1)
RBC # BLD: 3.93 M/UL (ref 4–5.2)
SODIUM BLD-SCNC: 141 MMOL/L (ref 136–145)
TOTAL PROTEIN: 6.6 G/DL (ref 6.4–8.2)
TROPONIN: <0.01 NG/ML
WBC # BLD: 5.8 K/UL (ref 4–11)

## 2021-12-17 PROCEDURE — 93010 ELECTROCARDIOGRAM REPORT: CPT | Performed by: INTERNAL MEDICINE

## 2021-12-17 PROCEDURE — 71046 X-RAY EXAM CHEST 2 VIEWS: CPT

## 2021-12-17 PROCEDURE — 85025 COMPLETE CBC W/AUTO DIFF WBC: CPT

## 2021-12-17 PROCEDURE — 95811 POLYSOM 6/>YRS CPAP 4/> PARM: CPT | Performed by: INTERNAL MEDICINE

## 2021-12-17 PROCEDURE — 99282 EMERGENCY DEPT VISIT SF MDM: CPT

## 2021-12-17 PROCEDURE — 80053 COMPREHEN METABOLIC PANEL: CPT

## 2021-12-17 PROCEDURE — 93005 ELECTROCARDIOGRAM TRACING: CPT | Performed by: EMERGENCY MEDICINE

## 2021-12-17 PROCEDURE — 84484 ASSAY OF TROPONIN QUANT: CPT

## 2021-12-17 ASSESSMENT — PAIN DESCRIPTION - LOCATION: LOCATION: CHEST

## 2021-12-17 ASSESSMENT — PAIN DESCRIPTION - PAIN TYPE: TYPE: ACUTE PAIN

## 2021-12-17 ASSESSMENT — PAIN SCALES - GENERAL: PAINLEVEL_OUTOF10: 5

## 2021-12-17 NOTE — ED PROVIDER NOTES
Emergency Physician Note  [address] [facility] ED  [address] [address]  Dept: [phone]  Loc: [phone]  Open Note Time:  6:31 AM EST    Chief Complaint  Chest Pain (Was in sleep lab when CP started. )       History of Present Illness  Juan José Perez is a 80 y.o. female  has a past medical history of Asthma, Diverticulosis, DJD (degenerative joint disease), Hyperlipidemia, Hyperparathyroidism, unspecified ([address] Hypertension, Osteopenia, Pneumonia, Renal artery stenosis ([address] Sleep apnea, and Spina bifida ([address] who presents to the ED for neck pain. Patient was in the sleep lab she woke up and she had chest pain that was in her left breast, was sharp, rapid was called and I went to evaluate the patient in the sleep lab. She is sitting up in bed, alert and oriented. By the time I reevaluated her in the ER she said the pain has completely resolved. Denies fever,  shortness of breath, cough, abdominal pain, nausea, vomiting, diarrhea, headache,   back pain, rash. No palliative/provocative factors. Unless otherwise stated in this report or unable to obtain because of the patient's clinical or mental status as evidenced by the medical record, this patient's positive and negative responses for review of systems, constitutional, psych, eyes, ENT, cardiovascular, respiratory, gastrointestinal, neurological, genitourinary, musculoskeletal, integument systems and systems related to the presenting problem are either stated in the preceding paragraph or were not pertinent or were negative for the symptoms and/or complaints related to the medical problem. I have reviewed the following from the nursing documentation:      Prior to Admission medications    Medication Sig Start Date End Date Taking?  Authorizing Provider   amLODIPine (NORVASC) 5 MG tablet Take 1 tablet by mouth 2 times daily 12/14/21   COREY Saleh MD   DULoxetine (CYMBALTA) 30 MG extended release capsule Take 1 capsule by mouth daily 10/8/21   COREY Goodrich MD   Zinc 25 MG TABS Take by mouth    Historical Provider, MD   Glucosamine-Chondroit-Vit C-Mn (GLUCOSAMINE CHONDR 1500 COMPLX PO) Take by mouth    Historical Provider, MD   naproxen (NAPROSYN) 250 MG tablet Take 250 mg by mouth 2 times daily (with meals)    Historical Provider, MD   SENSIPAR 30 MG tablet Take 1 tablet by mouth daily 4/5/17   Historical Provider, MD   Melatonin 5 MG TABS tablet Take  by mouth. Historical Provider, MD   enalapril (VASOTEC) 10 MG tablet Take 2 tablets by mouth daily. Takes 10 mg in morning, 5 mg in pm 4/3/14   Hardy Chambers MD   vitamin E 1000 UNITS capsule Take 4,000 Units by mouth daily. Historical Provider, MD   Ascorbic Acid (VITAMIN C) 500 MG tablet Take 500 mg by mouth daily. Historical Provider, MD [place]  by Does not apply route. Patient not taking: Reported on 10/8/2021    Historical Provider, MD   aspirin 81 MG EC tablet Take 81 mg by mouth every 48 hours. Historical Provider, MD   BL GLUCOSAMINE-CHONDROITIN PO Take  by mouth 2 times daily. Historical Provider, MD   simvastatin (ZOCOR) 20 MG tablet Take 20 mg by mouth nightly.       Historical Provider, MD       Allergies as of 12/17/2021 - Fully Reviewed 12/17/2021   Allergen Reaction Noted    Adhesive tape Rash 11/07/2016       Past Medical History:   Diagnosis Date    Asthma     Diverticulosis     DJD (degenerative joint disease)     thoracic, lumbar    Hyperlipidemia     Hyperparathyroidism, unspecified ([address]     Hypertension     Osteopenia     stopped fosamax 11/2014 after being on for 10years    Pneumonia     [facility] artery stenosis ([facility]     Sleep apnea     Spina bifida [facility])         Surgical History:   Past Surgical History:   Procedure Laterality Date    ANGIOPLASTY      rt renal artery stenosis    BREAST REDUCTION SURGERY  1997    bilateral    COLONOSCOPY  11/18/2008    COLONOSCOPY  5/14/13    bx    DILATION AND CURETTAGE OF UTERUS          Family History:    Family History   Problem Relation Age of Onset   Dioni Eason Cancer Mother 80        duodenal    Heart Disease Father 78    Hypertension Father     Hypertension Brother     Diabetes Brother     Cancer Brother         type 2    Hypertension Brother     Diabetes Brother     Hypertension Brother     Heart Disease Other         AAA    Asthma Neg Hx     Emphysema Neg Hx        Social History     Socioeconomic History    Marital status:      Spouse name: Not on file    Number of children: Not on file    Years of education: Not on file    Highest education level: Not on file   Occupational History    Occupation: retired     Comment: occupational health   Tobacco Use    Smoking status: Never Smoker    Smokeless tobacco: Never Used   Substance and Sexual Activity    Alcohol use: No    Drug use: No    Sexual activity: Not on file   Other Topics Concern    Not on file   Social History Narrative    Not on file     Social Determinants of Health     Financial Resource Strain: Low Risk     Difficulty of Paying Living Expenses: Not hard at all   Food Insecurity: No Food Insecurity    Worried About Running Out of Food in the Last Year: Never true    [address] in the Last Year: Never true   Transportation Needs: No Transportation Needs    Lack of Transportation (Medical): No    Lack of Transportation (Non-Medical):  No   Physical Activity:     Days of Exercise per Week: Not on file    Minutes of Exercise per Session: Not on file   Stress:     Feeling of Stress : Not on file   Social Connections:     Frequency of Communication with Friends and Family: Not on file    Frequency of Social Gatherings with Friends and Family: Not on file    Attends Confucianist Services: Not on file    Active Member of Clubs or Organizations: Not on file    Attends Club or Organization Meetings: Not on file    Marital Status: Not on file   Intimate Partner Violence:     Fear of Current or Ex-Partner: Not on file    Emotionally Abused: Not on file    Physically Abused: Not on file    Sexually Abused: Not on file   Housing Stability:     Unable to Pay for Housing in the Last Year: Not on file    Number of Places Lived in the Last Year: Not on file    Unstable Housing in the Last Year: Not on file       Nursing notes reviewed. ED Triage Vitals [12/17/21 0535]   Enc Vitals Group      BP (!) 137/113      Pulse 86      Resp 16      Temp 98.2 °F (36.8 °C)      Temp Source Oral      SpO2 97 %      Weight 148 lb (67.1 kg)      Height 5' 2\" (1.575 m)      Head Circumference       Peak Flow       Pain Score       Pain Loc       Pain Edu? Excl. in [address] GENERAL:   Body mass index is 27.07 kg/m². Awake, alert. Well developed, well nourished with no apparent distress. Nontoxic-appearing, non-ill-appearing. HENT:   Normocephalic, Atraumatic, no lacerations. No ENT exam due to PPE. EYES:   Conjunctiva normal,   Pupils equal round and reactive to light,   Extraocular movements normal.  NECK:  Trachea is midline. No stridor. CHEST:  Regular rate and regular rhythm, no murmurs/rubs/gallops,  normal S1/S2, chest wall non-tender. I did palpate some fibrous changes on her left breast, patient states that she has not had a mammogram for a while. I encouraged her to do so  LUNGS:  No respiratory distress. No abdominal retractions, no sternal retractions  Clear to auscultation bilaterally, no wheezing, no rhochi, no rales  Speaking comfortably in full sentences  ABDOMEN:  Soft, non-tender, non-distended. No guarding. No rebound. No costovertebral angle tenderness to palpation. Normal BS, no organomegaly, no abdominal masses  EXTREMITIES:  Moves extremities x4 with purpose. Normal range of motion, no edema,  No tenderness, no deformity,  distal pulses present and equal bilaterally.   BACK:  No midline tenderness in the cervical, thoracic, and lumbar spine.  No deformities, no step-off. Palpation did not elicit any paraspinous tenderness. SKIN:  Warm, dry and intact. NEUROLOGIC:  Normal mental status. Moving all extremities to command. Alert and oriented x4  without focal motor deficit or gross sensory deficit. Normal speech. PSYCHIATRIC:  Not anxious,  normal mood and affect,  Appropriate eye contact,  thoughts are linear and organized,  without delusions/hallucinations,  Not responding to internal stimuli,  responds appropriately to questions    LABS and DIAGNOSTIC RESULTS  EKG  The Ekg interpreted by me shows  normal sinus rhythm with a rate of 80  Axis is   Normal  QTc is  within an acceptable range  Intervals and Durations are unremarkable. ST Segments: no acute change and normal  Delta waves, Brugada Syndrome, and Short NC are not present. Prior EKG to compare with was available. No significant changes compared to prior EKG from May 10, 2016      RADIOLOGY  X-RAYS:  I have reviewed radiologic plain film image(s). ALL OTHER NON-PLAIN FILM IMAGES SUCH AS CT, ULTRASOUND AND MRI HAVE BEEN READ BY THE RADIOLOGIST.   XR CHEST (2 VW)   Final Result   No acute disease              Chest X-Ray  Interpreted by: Emergency Department Physician  View: PA/Lateral chest xray  Findings: See radiology report    LABS  Results for orders placed or performed during the hospital encounter of 12/17/21   CBC Auto Differential   Result Value Ref Range    WBC 5.8 4.0 - 11.0 K/uL    RBC 3.93 (L) 4.00 - 5.20 M/uL    Hemoglobin 12.7 12.0 - 16.0 g/dL    Hematocrit 37.6 36.0 - 48.0 %    MCV 95.6 80.0 - 100.0 fL    MCH 32.2 26.0 - 34.0 pg    MCHC 33.7 31.0 - 36.0 g/dL    RDW 13.0 12.4 - 15.4 %    Platelets 099 026 - 509 K/uL    MPV 8.2 5.0 - 10.5 fL    Neutrophils % 58.2 %    Lymphocytes % 23.0 %    Monocytes % 11.1 %    Eosinophils % 6.4 %    Basophils % 1.3 %    Neutrophils Absolute 3.4 1.7 - 7.7 K/uL    Lymphocytes Absolute 1.3 1.0 - 5.1 K/uL    Monocytes Absolute 0.6 0.0 - 1.3 K/uL    Eosinophils Absolute 0.4 0.0 - 0.6 K/uL    Basophils Absolute 0.1 0.0 - 0.2 K/uL   Comprehensive Metabolic Panel w/ Reflex to MG   Result Value Ref Range    Sodium 141 136 - 145 mmol/L    Potassium reflex Magnesium 3.9 3.5 - 5.1 mmol/L    Chloride 106 99 - 110 mmol/L    CO2 26 21 - 32 mmol/L    Anion Gap 9 3 - 16    Glucose 87 70 - 99 mg/dL    BUN 16 7 - 20 mg/dL    CREATININE 0.9 0.6 - 1.2 mg/dL    GFR Non-African American >60 >60    GFR African American >60 >60    Calcium 10.6 8.3 - 10.6 mg/dL    Total Protein 6.6 6.4 - 8.2 g/dL    Albumin 4.3 3.4 - 5.0 g/dL    Albumin/Globulin Ratio 1.9 1.1 - 2.2    Total Bilirubin 0.6 0.0 - 1.0 mg/dL    Alkaline Phosphatase 93 40 - 129 U/L    ALT 34 10 - 40 U/L    AST 32 15 - 37 U/L   Troponin   Result Value Ref Range    Troponin <0.01 <0.01 ng/mL   EKG 12 Lead   Result Value Ref Range    Ventricular Rate 80 BPM    Atrial Rate 80 BPM    P-R Interval 148 ms    QRS Duration 96 ms    Q-T Interval 364 ms    QTc Calculation (Bazett) 419 ms    P Axis 39 degrees    R Axis -9 degrees    T Axis 43 degrees    Diagnosis Normal sinus rhythmNormal ECG        SCREENINGS  NIH Score     [facility]    Heart Score       PROCEDURES    MEDICAL DECISION MAKING    Procedures/interventions/images ordered for this visit  Orders Placed This Encounter   Procedures    XR CHEST (2 VW)    CBC Auto Differential    Comprehensive Metabolic Panel w/ Reflex to MG    Initiate Oxygen Therapy Protocol    EKG 12 Lead    Saline lock IV       Medications ordered for this visit  No orders of the defined types were placed in this encounter. ED course notes for this visit  ED Course as of 12/17/21 0631   Fri Dec 17, 2021   0817 I had a discussion with the patient, went over the chest x-ray and the lab results. She states the pain is now down to 1 out of 10. She really feels that it is within her breast and not on her chest wall or within her chest cavity.   sHe has a lot of fibrous tissues in that region of her breasts. We did discuss her risk factors and she states she feels that she would rather go home. And follow-up as an outpatient. She currently does not have a PCP, and I also explained to her again to give her a referral to cardiology. [SG]      ED Course User Index  [SG] Glenis Araiza MD       I evaluated the patient in room 10/10          THORACIC AORTIC DISSECTION SCREENING (TADS):    Associated New Neuro Deficies?: +0  No  Radial/Femoral Pulses Feel Uneven?: +0  No  Pain Maximal at Onset or Abrupt?: +0  No  Pain severe, ripping, or tearing?: +0  No  Migrates:  Chest, back, or abdomen?: +0  No  Chest XR Normal?: -1 Yes  A normal chest x-ray is defined as 1. Normal mediastinum, and 2. No pleural effusion, and 3. no apical pleural (curb density of the lung apex)    TADS score: < 0. This falls under the following category: Score of 0, odds of aortic dissection is <  1/1000, no further workup needed regarding the aorta and supports discharge    I engaged in a shared decision making discussion with the patient about the risk and potential benefits of CT scanning and they concurred with the plan to proceed without a CT scan as the risk is deemed a outweigh any potential benefit at this time.     HEART SCORE:    History: +0 for low suspicion  \"Highly suspicious\" = High risk features:  middle or left-sided, heavy chest pain, diaphoresis, initiated by exercise, emotions or cold, radiation, N/V, and/or relief of symptoms by sublingual nitrates  \"Moderately suspicious\" = Mixture of high-risk and low-risk features  \"Low suspicious\" = Well localized, sharp pain, non-exertional, no diaphoresis, no N/V  EKG: +0 for normal EKG   \"Nonspecific changes\" = repolarization abnormalities, nonspecific T wave changes, nonspecific ST segment depression elevation, bundle branch blocks (even if old), pacemaker rhythm, LVH, early repolarization, digoxin effect   Age: +4 for age >71 years  Risk factors (includes HLD, HTN, DM, tobacco use, obesity, and +FHx): +1 for 1-2 risk factors  \"Smoking\" = Current or within the past month, \"family history\" = parents, siblings, children with diagnoses of CAD  \"Obesity\" = BMI > 30  Initial troponin: +0 for negative troponin (0-0.04)    Heart score: 3. This falls under the following category: Score of 0-3, which indicates a very low risk (0.9-1.7%) for major adverse cardiac event and supports early discharge. Shared decision making and documentation:  HEART Score is less than or equal to 3 and one negative troponin (patient declined ED observation and serial troponin) - No hospitalization indicated. I completed a HEART Score to screen for Major Adverse Cardiac Event (MACE) in this patient. The evidence indicates that the patient is low risk for MACE and this is consistent with my clinical intuition. The risk of further workup or hospitalization for MACE is likely higher than the risk of the patient having a MACE. It is, therefore, in the patient's best interest not to do additional emergent testing or to be hospitalized for MACE. I have discussed with the patient my clinical impression and the result of the HEART Score to screen for MACE, as well as the risks of further testing and hospitalization. The HEART Score shows that the risk for MACE is less than 2%. Although the risk of ACS has not been eliminated, the risks of further testing or hospitalization likely exceed the benefit, and the patient declines further emergent evaluation or hospitalization for ACS. This is a very pleasant patient with chest pain. I did offer the patient to stay and do a serial troponin and I also explained to her that it has been a while since she has had any cardiology work-up and recommended that she should stay for further work-up, at this time patient feels that the chest pain was not cardiac related and she chose to go home instead.   I encouraged her to follow-up with her primary care physician and I also gave her referral to cardiology. On physical exam, patient does not have any abnormal heart or lung sounds. The work up in the ED indicates patient is without significant evidence of acute coronary syndrome, pulmonary embolism, thoracic aortic dissection, pericarditis, pneumothorax, esophageal rupture, pneumonia, toxicity, shock, sepsis, unstable arrhythmia, hemodynamic or cardiopulmonary instability, herpes zoster, or any disease process requiring other immediate medical or surgical intervention at this time. It is understood that if the patient is not improving as expected or if other new symptoms or signs of concern develop, other etiologies or diagnoses may need to be considered requiring other tests, treatments, consultations, and/or admission. The diagnosis, plan, expected course, follow-up, and return precautions were discussed and all questions were answered. Final Impression    1. Chest pain, unspecified type    2. Essential hypertension        Blood pressure (!) 144/70, pulse 64, temperature 98.2 °F (36.8 °C), temperature source Oral, resp. rate 16, height 5' 2\" (1.575 m), weight 148 lb (67.1 kg), SpO2 95 %. Medication Summary  Patient was given scripts for the following medications. I counseled patient how to take these medications. Discharge Medication List as of 12/17/2021  7:04 AM          Patient had scripts modified or refilled for the following medications. I counseled patient how to take these medications. Discharge Medication List as of 12/17/2021  7:04 AM          Patient had scripts discontinues for the following medications. I counseled patient to stop taking these medications. Discharge Medication List as of 12/17/2021  7:04 AM            Disposition  At this point I do not feel the patient requires further work up and it is reasonable to discharge the patient.  I had a discussion with the patient and/or their surrogate regarding diagnosis, diagnostic testing results, treatment/ plan of care, and follow up. Patient and/or companions verbalized understanding of the ED workup, any relevant findings as well as any incidental findings, and the disposition and plan. There was shared decision-making between myself as well as the patient and/or their surrogate and we are all in agreement with discharge home. Jamie Fitzpatrick was an opportunity for questions and all questions were answered to the best of my ability and to the satisfaction of the patient and/or patient's family. Patient agreed to follow up as recommend for further evaluation/treatment. The patient was given strict return precautions as we discussed symptoms that would necessitate return to the ED. Patient will return to ED for new/worsening symptoms. The patient verbalized their understanding and agreement with the above plan. Please refer to AVS for further details regarding discharge instructions. Pt is in stable condition upon Discharge to home. Pt was seen during the [place] 19 pandemic. Appropriate PPE worn by this writer during patient encounters. Pt seen during a time with constrained hospital bed capacity and other potential inpatient and outpatient resources were constrained due to the viral pandemic. The note was completed using Dragon voice recognition transcription. Every effort was made to ensure accuracy; however, inadvertent transcription errors may be present despite my best efforts to edit errors.     Ray Quispe MD  [address] [facility]        Ray Quispe MD  12/21/21 2625

## 2021-12-17 NOTE — DISCHARGE INSTR - COC
Continuity of Care Form    Patient Name: Mark Side   :  1940  MRN:  [id number]    Admit date:  2021  Discharge date:  ***    Code Status Order: No Order   Advance Directives:      Admitting Physician:  No admitting provider for patient encounter.   PCP: Alan Whyte MD    Discharging Nurse: [facility] Unit/Room#: 10/10  Discharging Unit Phone Number: ***    Emergency Contact:   Extended Emergency Contact Information  Primary Emergency Contact: Sudeep Colunga  Address: [address] [place] [address] [address], [address] Phone: [phone]  Relation: Spouse  Secondary Emergency Contact: Anshu Whitfield Phone: [phone]  Relation: Child    Past Surgical History:  Past Surgical History:   Procedure Laterality Date    ANGIOPLASTY      rt renal artery stenosis    BREAST REDUCTION SURGERY      bilateral    COLONOSCOPY  2008    COLONOSCOPY  13    bx    DILATION AND CURETTAGE OF UTERUS         Immunization History:   Immunization History   Administered Date(s) Administered    COVID-19, Pfizer, PF, 30mcg/0.3mL 2021, 2021    Influenza A (Y1Z4-75) Vaccine PF IM 2009    Influenza Vaccine, unspecified formulation 10/17/2016, 2017    Influenza Virus Vaccine 10/17/2016, 2017, 2020    Influenza, High Dose (Fluzone 65 yrs and older) 2019    Influenza, Quadv, Recombinant, IM PF (Flublok 18 yrs and older) 10/13/2020    Influenza, Quadv, adjuvanted, 65 yrs +, IM, PF (Fluad) 10/08/2021    Pneumococcal Conjugate 13-valent (Eduin Card) 2015, 2017    Pneumococcal Conjugate 7-valent (Prevnar7) 2015    Pneumococcal Polysaccharide (Gpvtehrjf15) 2017    Tdap (Boostrix, Adacel) 2016    Zoster Live (Zostavax) 2013, 10/17/2016       Active Problems:  Patient Active Problem List   Diagnosis Code    Essential hypertension I10    Pure hypercholesterolemia E78.00    Pain in both knees M25.561, M25.562    Primary osteoarthritis of knees, bilateral M17.0    Chronic renal failure, stage 3 (moderate) (HCC) N18.30    Hyperparathyroidism, primary (HCC) E21.0    Obstructive sleep apnea syndrome G47.33    Acute midline thoracic back pain M54.6    Moderate obstructive sleep apnea G47.33    Snoring R06.83    Witnessed episode of apnea R06.81    Neck pain M54.2    Chronic RLQ pain R10.31, G89.29       Isolation/Infection:   Isolation            No Isolation          Patient Infection Status       None to display            Nurse Assessment:  Last Vital Signs: BP (!) 137/113   Pulse 86   Temp 98.2 °F (36.8 °C) (Oral)   Resp 16   Ht 5' 2\" (1.575 m)   Wt 148 lb (67.1 kg)   SpO2 97%   BMI 27.07 kg/m²     Last documented pain score (0-10 scale): Pain Level: 5  Last Weight:   Wt Readings from Last 1 Encounters:   12/17/21 148 lb (67.1 kg)     Mental Status:  {IP PT MENTAL STATUS:01511}    IV Access:  {[facility] IV ACCESS:512368787}    Nursing Mobility/ADLs:  Walking   {P DME DUHZ:203662487}  Transfer  {[facility] DME PJKW:807853597}  Bathing  {[facility] DME PMVJ:377712794}  Dressing  {CH[facility] DME PLRJ:178253062}  Toileting  {CH[facility] DME OSXO:226435721}  Feeding  {[facility] DME FGKB:617770043}  Med Admin  {[facility] DME GYKO:088359518}  Med Delivery   {[facility] MED Delivery:722804797}    Wound Care Documentation and Therapy:        Elimination:  Continence: Bowel: {YES / HA:69437}  Bladder: {YES / BD:10711}  Urinary Catheter: {Urinary Catheter:759314740}   Colostomy/Ileostomy/Ileal Conduit: {YES / NN:35815}       Date of Last BM: ***  No intake or output data in the 24 hours ending 12/17/21 0631  No intake/output data recorded.     Safety Concerns:     [employer] Safety Concerns:901636013}    Impairments/Disabilities:      [employer] Impairments/Disabilities:698010473}    Nutrition Therapy:  Current Nutrition Therapy:   [employer] Diet List:602249819}    Routes of Feeding: {CHP DME Other Feedings:318990110}  Liquids: {Slp liquid thickness:62375}  Daily Fluid Restriction: {CHP DME Yes amt example:161198362}  Last Modified Barium Swallow with Video (Video Swallowing Test): {Done Not Done LTRV:672346040}    Treatments at the Time of Hospital Discharge:   Respiratory Treatments: ***  Oxygen Therapy:  {Therapy; copd oxygen:80023}  Ventilator:    { CC Vent MKWJ:125506267}    Rehab Therapies: {THERAPEUTIC INTERVENTION:8361075214}  Weight Bearing Status/Restrictions: { CC Weight Bearin}  Other Medical Equipment (for information only, NOT a DME order):  {EQUIPMENT:268211543}  Other Treatments: ***    Patient's personal belongings (please select all that are sent with patient):  {CHP DME Belongings:732751500}    RN SIGNATURE:  {Esignature:564782785}    CASE MANAGEMENT/SOCIAL WORK SECTION    Inpatient Status Date: ***    Readmission Risk Assessment Score:  Readmission Risk              Risk of Unplanned Readmission:  0           Discharging to Facility/ Agency   Name:   Address:  Phone:  Fax:    Dialysis Facility (if applicable)   Name:  Address:  Dialysis Schedule:  Phone:  Fax:    / signature: {Esignature:481216924}    PHYSICIAN SECTION    Prognosis: {Prognosis:5038429550}    Condition at Discharge: [address] [place] [address] Patient Condition:840605961}    Rehab Potential (if transferring to Rehab): {Prognosis:0801406646}    Recommended Labs or Other Treatments After Discharge: ***    Physician Certification: I certify the above information and transfer of Hernandez Arevalo  is necessary for the continuing treatment of the diagnosis listed and that she requires {Admit to Appropriate Level of Care:56644} for {GREATER/LESS:184439899} 30 days.      Update Admission H&P: {CHP DME Changes in IBVOH:596603298}    PHYSICIAN SIGNATURE:  {Esignature:601959391}

## 2021-12-17 NOTE — SIGNIFICANT EVENT
Rapid Response Team  Progress Note  10/10      Event Date: 12/17/2021   Time Called: 0512 Arrival Time:  0516 Event End Time: 0530    Room#: sleep lab   Nurse Responder: osei  Patient tech: Starr Ojeda, sleep tech  ED Dr Maria G Greco responded to the RRT    Situation: (Brief reason RRT requested) acute chest pain post sleep study   Paged Overhead: Yes   Previous MD Orders: no        Background: Admit Date: 12/17/2021        Assessment:                    Mental Status: alert   Neuro Check: fsc   CV: pain slowly resolving   Respiratory: easy and even   Abdomen: wnl        Recommendation/Interventions:   RRT Orders: (Breathing, CP, Circulation, Neuro, Mews >4)   Pt brought to the ED for evaluation

## 2021-12-21 ASSESSMENT — HEART SCORE
ECG: 0
ECG: 0

## 2021-12-29 ENCOUNTER — TELEPHONE (OUTPATIENT)
Dept: FAMILY MEDICINE CLINIC | Age: 81
End: 2021-12-29

## 2021-12-29 NOTE — TELEPHONE ENCOUNTER
Patient calling to establish care with DR Jourdan Jefferson as he was recommended to her from [facility]  as he is retiring. I need schedule her for 2/28/2022 which was 1st available for a NP. She is concerned regarding her blood pressure and would like a sooner appointment if one comes available.  I did place her on the wait list.

## 2021-12-30 NOTE — TELEPHONE ENCOUNTER
She can continue to go to [facility] if needed until she establishes care here.

## 2022-01-05 ENCOUNTER — TELEPHONE (OUTPATIENT)
Dept: PULMONOLOGY | Age: 82
End: 2022-01-05

## 2022-01-05 DIAGNOSIS — G47.33 OSA (OBSTRUCTIVE SLEEP APNEA): Primary | ICD-10-CM

## 2022-02-28 ENCOUNTER — OFFICE VISIT (OUTPATIENT)
Dept: FAMILY MEDICINE CLINIC | Age: 82
End: 2022-02-28
Payer: MEDICARE

## 2022-02-28 VITALS
BODY MASS INDEX: 25.61 KG/M2 | OXYGEN SATURATION: 99 % | WEIGHT: 140 LBS | SYSTOLIC BLOOD PRESSURE: 180 MMHG | HEART RATE: 55 BPM | DIASTOLIC BLOOD PRESSURE: 78 MMHG

## 2022-02-28 DIAGNOSIS — G89.29 CHRONIC RLQ PAIN: Primary | ICD-10-CM

## 2022-02-28 DIAGNOSIS — E21.0 HYPERPARATHYROIDISM, PRIMARY (HCC): ICD-10-CM

## 2022-02-28 DIAGNOSIS — E78.00 PURE HYPERCHOLESTEROLEMIA: ICD-10-CM

## 2022-02-28 DIAGNOSIS — R10.31 CHRONIC RLQ PAIN: Primary | ICD-10-CM

## 2022-02-28 DIAGNOSIS — N18.31 CHRONIC RENAL FAILURE, STAGE 3A (HCC): ICD-10-CM

## 2022-02-28 DIAGNOSIS — I10 ESSENTIAL HYPERTENSION: ICD-10-CM

## 2022-02-28 PROCEDURE — G8417 CALC BMI ABV UP PARAM F/U: HCPCS | Performed by: STUDENT IN AN ORGANIZED HEALTH CARE EDUCATION/TRAINING PROGRAM

## 2022-02-28 PROCEDURE — 1036F TOBACCO NON-USER: CPT | Performed by: STUDENT IN AN ORGANIZED HEALTH CARE EDUCATION/TRAINING PROGRAM

## 2022-02-28 PROCEDURE — G8399 PT W/DXA RESULTS DOCUMENT: HCPCS | Performed by: STUDENT IN AN ORGANIZED HEALTH CARE EDUCATION/TRAINING PROGRAM

## 2022-02-28 PROCEDURE — G8484 FLU IMMUNIZE NO ADMIN: HCPCS | Performed by: STUDENT IN AN ORGANIZED HEALTH CARE EDUCATION/TRAINING PROGRAM

## 2022-02-28 PROCEDURE — G8427 DOCREV CUR MEDS BY ELIG CLIN: HCPCS | Performed by: STUDENT IN AN ORGANIZED HEALTH CARE EDUCATION/TRAINING PROGRAM

## 2022-02-28 PROCEDURE — 99203 OFFICE O/P NEW LOW 30 MIN: CPT | Performed by: STUDENT IN AN ORGANIZED HEALTH CARE EDUCATION/TRAINING PROGRAM

## 2022-02-28 PROCEDURE — 1090F PRES/ABSN URINE INCON ASSESS: CPT | Performed by: STUDENT IN AN ORGANIZED HEALTH CARE EDUCATION/TRAINING PROGRAM

## 2022-02-28 PROCEDURE — 1123F ACP DISCUSS/DSCN MKR DOCD: CPT | Performed by: STUDENT IN AN ORGANIZED HEALTH CARE EDUCATION/TRAINING PROGRAM

## 2022-02-28 PROCEDURE — 4040F PNEUMOC VAC/ADMIN/RCVD: CPT | Performed by: STUDENT IN AN ORGANIZED HEALTH CARE EDUCATION/TRAINING PROGRAM

## 2022-02-28 NOTE — PROGRESS NOTES
2022    Teetee Trujillo (:  1940) is a 80 y.o. female, here for evaluation of the following medical concerns:  Chief Complaint   Patient presents with   [facility] Patient     establishment needs CPAP Machine and wants to discuss inspire sleep     Alopecia    Abdominal Pain    Eczema       HPI    HTN  Norvasc  enalipril    Hyperparathyroidism  sensipar - 2.5 years  Follows with Dr. Geoffrey Be    CKD 3  Following with nephrology    HLD  zocor    Stress with  having injury of lumbar spine and requiring nursing home care    HOANG   Waiting on CPAP machine  Follows with Dr. Joaquín Frazier    Bilateral knee pain  Recommended surgery 15 years  Has had multiple knee injections    Stopped cymbalta  Previously started and stopped    RLQ pain  Ongoing for last 9-12 months  Not associated with BM  Sometimes wakes up with this pain, 90 minutes, improves with meds  No identifiable triggers  Strong family history of colon cancer    Review of Systems  All other systems reviewed and negative    Prior to Visit Medications    Medication Sig Taking? Authorizing Provider   amLODIPine (NORVASC) 5 MG tablet Take 1 tablet by mouth 2 times daily Yes Yeni Bartlett MD   Zinc 25 MG TABS Take by mouth Yes Historical Provider, MD   Glucosamine-Chondroit-Vit C-Mn (GLUCOSAMINE CHONDR 1500 COMPLX PO) Take by mouth Yes Historical Provider, MD   naproxen (NAPROSYN) 250 MG tablet Take 250 mg by mouth 2 times daily (with meals) Yes Historical Provider, MD   SENSIPAR 30 MG tablet Take 1 tablet by mouth daily Yes Historical Provider, MD   Melatonin 5 MG TABS tablet Take  by mouth. Yes Historical Provider, MD   enalapril (VASOTEC) 10 MG tablet Take 2 tablets by mouth daily. Takes 10 mg in morning, 5 mg in pm Yes Lorin Sarmiento MD   vitamin E 1000 UNITS capsule Take 4,000 Units by mouth daily. Yes Historical Provider, MD   Ascorbic Acid (VITAMIN C) 500 MG tablet Take 500 mg by mouth daily.  Yes Historical Provider, MD Otoniel Barrientos Dr by Does not apply route  Yes Historical Provider, MD   aspirin 81 MG EC tablet Take 81 mg by mouth every 48 hours. Yes Historical Provider, MD ABREU GLUCOSAMINE-CHONDROITIN PO Take  by mouth 2 times daily. Yes Historical Provider, MD   simvastatin (ZOCOR) 20 MG tablet Take 20 mg by mouth nightly.    Yes Historical Provider, MD   DULoxetine (CYMBALTA) 30 MG extended release capsule Take 1 capsule by mouth daily  Patient not taking: Reported on 2/28/2022  Thomas Streeter MD        Allergies   Allergen Reactions    Adhesive Tape Rash       Past Medical History:   Diagnosis Date    Asthma     Diverticulosis     DJD (degenerative joint disease)     thoracic, lumbar    Hyperlipidemia     [facility], unspecified ([facility] [address] [facility]     Hypertension     Osteopenia     stopped fosamax 11/2014 after being on for 10years    Pneumonia     Renal artery stenosis ([facility] [facility]     Sleep apnea     Spina bifida ([facility] [address] [facility]        Past Surgical History:   Procedure Laterality Date    ANGIOPLASTY      rt renal artery stenosis    BREAST REDUCTION SURGERY  1997    bilateral    COLONOSCOPY  11/18/2008    COLONOSCOPY  5/14/13    bx    DILATION AND CURETTAGE OF UTERUS         Social History     Socioeconomic History    Marital status:      Spouse name: Not on file    Number of children: Not on file    Years of education: Not on file    Highest education level: Not on file   Occupational History    Occupation: retired     Comment: occupational health   Tobacco Use    Smoking status: Never Smoker    Smokeless tobacco: Never Used   Substance and Sexual Activity    Alcohol use: No    Drug use: No    Sexual activity: Not on file   Other Topics Concern    Not on file   Social History Narrative    Not on file     Social Determinants of Health     Financial Resource Strain: Low Risk     Difficulty of Paying Living Expenses: Not hard at all   Food Insecurity: No Food Insecurity    Worried About [facility] in the Last Year: Never true    Major of Food in the Last Year: Never true   Transportation Needs: No Transportation Needs    Lack of Transportation (Medical): No    Lack of Transportation (Non-Medical): No   Physical Activity:     Days of Exercise per Week: Not on file    Minutes of Exercise per Session: Not on file   Stress:     Feeling of Stress : Not on file   Social Connections:     Frequency of Communication with Friends and Family: Not on file    Frequency of Social Gatherings with Friends and Family: Not on file    Attends Mormonism Services: Not on file    Active Member of [address] [place] [address] [employer] or Organizations: Not on file    Attends Club or Organization Meetings: Not on file    Marital Status: Not on file   Intimate Partner Violence:     Fear of Current or Ex-Partner: Not on file    Emotionally Abused: Not on file    Physically Abused: Not on file    Sexually Abused: Not on file   Housing Stability:     Unable to Pay for Housing in the Last Year: Not on file    Number of [place] in the Last Year: Not on file    Unstable Housing in the Last Year: Not on file        Family History   Problem Relation Age of Onset    Cancer Mother 80        duodenal    Heart Disease Father 78    Hypertension Father     Hypertension Brother     Diabetes Brother     Cancer Brother         type 2    Hypertension Brother     Diabetes Brother     Hypertension Brother     Heart Disease Other         AAA    Asthma Neg Hx     Emphysema Neg Hx        Vitals:    02/28/22 1530   BP: (!) 180/78   Site: Left Upper Arm   Position: Sitting   Cuff Size: Medium Adult   Pulse: 55   SpO2: 99%   Weight: 140 lb (63.5 kg)     Estimated body mass index is 25.61 kg/m² as calculated from the following:    Height as of 12/17/21: 5' 2\" (1.575 m). Weight as of this encounter: 140 lb (63.5 kg). Physical Exam  Vitals reviewed. Constitutional:       General: She is not in acute distress. Appearance: Normal appearance. She is not ill-appearing. HENT:      Head: Normocephalic and atraumatic. Right Ear: Tympanic membrane normal.      Left Ear: Tympanic membrane normal.   Eyes:      Extraocular Movements: Extraocular movements intact. Conjunctiva/sclera: Conjunctivae normal.   Cardiovascular:      Rate and Rhythm: Normal rate and regular rhythm. Pulses: Normal pulses. Heart sounds: Normal heart sounds. No murmur heard. Pulmonary:      Effort: Pulmonary effort is normal.      Breath sounds: Normal breath sounds. Abdominal:      General: Abdomen is flat. Bowel sounds are normal. There is no distension. Palpations: Abdomen is soft. Tenderness: There is abdominal tenderness (RLQ, no palpable mass, no rebound). Musculoskeletal:         General: No swelling. Normal range of motion. Right lower leg: No edema. Left lower leg: No edema. Skin:     General: Skin is warm and dry. Capillary Refill: Capillary refill takes less than 2 seconds. Findings: No rash. Neurological:      General: No focal deficit present. Mental Status: She is alert and oriented to person, place, and time. Psychiatric:         Mood and Affect: Mood normal.         Behavior: Behavior normal.         Thought Content: Thought content normal.         Judgment: Judgment normal.         ASSESSMENT/PLAN:  1. Hyperparathyroidism, primary ([facility] [address]  2. Chronic renal failure, stage 3a ([address]  Following with nephrology    3. Chronic RLQ pain  With strong family history of colon cancer would recommend GI evaluation. Will also ultrasound. Discussed possible ovarian involvement as well. - Stu Sánchez MD, Gastroenterology, [facility]  - US ABDOMEN COMPLETE; Future    4. Essential hypertension  Elevated today but has not taken medication, nervous about appointment. 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 Pure hypercholesterolemia  Recheck lipid panel  - Lipid Panel; Future      No follow-ups on file.     An  electronic signature was used to authenticate this note.     --Tina Montero, DO on 3/1/2022 at 8:49 PM

## 2022-03-07 ENCOUNTER — TELEPHONE (OUTPATIENT)
Dept: PULMONOLOGY | Age: 82
End: 2022-03-07

## 2022-03-07 NOTE — TELEPHONE ENCOUNTER
Patient cancelled appointment on 3/24/22 with Dr Kathrin Infante for 31-90. Reason: patient set up 3/4/22    Patient did reschedule appointment. Appointment rescheduled for 5/31/22. Assessment: 9/16/21      · Moderate HOANG  · Snoring and witnsessed apnea   · Obesity class  · Essential hypertension      Plan:       · Repeat HST evaluate for sleep related breathing disorder. · Treatment options were discussed with patient if HST reveals HOANG, including CPAP therapy, oral appliances, upper airway surgery and hypoglossal nerve stimulation. · Discussed with patient the pathophysiology of apnea. · Sleep hygiene  · Avoid sedatives, alcohol and caffeinated drinks at bed time. · No driving motorized vehicles or operating heavy machinery while fatigue, drowsy or sleepy. · Continue blood pressure medications - treatment of HOANG can lower blood pressure by levels that are clinically significant. · Weight loss is also recommended as a long-term intervention.     · Complications of HOANG if not treated were discussed with patient patient to include systemic hypertension, pulmonary hypertension, cardiovascular morbidities, car accidents and all cause mortality.

## 2022-03-18 ENCOUNTER — HOSPITAL ENCOUNTER (OUTPATIENT)
Dept: ULTRASOUND IMAGING | Age: 82
Discharge: HOME | End: 2022-03-18
Payer: MEDICARE

## 2022-03-18 DIAGNOSIS — G89.29 CHRONIC RLQ PAIN: ICD-10-CM

## 2022-03-18 DIAGNOSIS — R10.31 CHRONIC RLQ PAIN: ICD-10-CM

## 2022-03-18 PROCEDURE — 76700 US EXAM ABDOM COMPLETE: CPT

## 2022-03-21 ENCOUNTER — TELEPHONE (OUTPATIENT)
Dept: FAMILY MEDICINE CLINIC | Age: 82
End: 2022-03-21

## 2022-03-21 NOTE — TELEPHONE ENCOUNTER
----- Message from Berenice Barrow sent at 3/21/2022  2:18 PM EDT -----  Subject: Message to Provider    QUESTIONS  Information for Provider? pt would like a call of her ultra sound results   along with a copy of them. please call pt   ---------------------------------------------------------------------------  --------------  CALL BACK INFO  What is the best way for the office to contact you? OK to leave message on   voicemail  Preferred Call Back Phone Number? [phone]  ---------------------------------------------------------------------------  --------------  SCRIPT ANSWERS  Relationship to Patient?  Self

## 2022-03-22 ENCOUNTER — TELEPHONE (OUTPATIENT)
Dept: FAMILY MEDICINE CLINIC | Age: 82
End: 2022-03-22

## 2022-03-22 NOTE — TELEPHONE ENCOUNTER
Patient called asking about recent results from the Ultra Sound of the Abdomen Complete. Please advise.

## 2022-04-21 ENCOUNTER — TELEPHONE (OUTPATIENT)
Dept: PULMONOLOGY | Age: 82
End: 2022-04-21

## 2022-04-21 DIAGNOSIS — G47.33 OSA (OBSTRUCTIVE SLEEP APNEA): Primary | ICD-10-CM

## 2022-04-21 NOTE — TELEPHONE ENCOUNTER
DME calling to get clarification on Sleep study. Sleep study states patient was intolerant of cpap, Bipap initiated 17/13 but recommendations were cpap 9. Please clarify. LOV: 9/16/21    Assessment:       · Moderate HOANG  · Snoring and witnsessed apnea   · Obesity class  · Essential hypertension      Plan:       · Repeat HST evaluate for sleep related breathing disorder. · Treatment options were discussed with patient if HST reveals HOANG, including CPAP therapy, oral appliances, upper airway surgery and hypoglossal nerve stimulation. · Discussed with patient the pathophysiology of apnea. · Sleep hygiene  · Avoid sedatives, alcohol and caffeinated drinks at bed time. · No driving motorized vehicles or operating heavy machinery while fatigue, drowsy or sleepy. · Continue blood pressure medications - treatment of HOANG can lower blood pressure by levels that are clinically significant. · Weight loss is also recommended as a long-term intervention.     · Complications of HOANG if not treated were discussed with patient patient to include systemic hypertension, pulmonary hypertension, cardiovascular morbidities, car accidents and all cause mortality.

## 2022-04-21 NOTE — TELEPHONE ENCOUNTER
Patient did okay with CPAP 9 cmH2O based on the sleep study read  Will try BiPAP if patient fails CPAP

## 2022-04-21 NOTE — TELEPHONE ENCOUNTER
Insurance does not want to pay for cpap due to in sleep study it states patient did not tolerate cpap and Bipap was started, however in recommendation it states to try cpap. Insurance just needs clarification.

## 2022-04-22 NOTE — TELEPHONE ENCOUNTER
Faxed this documentation to Brandi Harkins to clarify. Will f/u with Brandi Harkins to make sure they rec'd our documentation and that order is being processed.

## 2022-04-27 NOTE — TELEPHONE ENCOUNTER
Spoke with Brittny Matthews at [facility] whom states that she has the documentation that was faxed. She will submit. Brittny Matthews states that they don't have the correct insurance for the pt. Printed and faxed the [employer] card. Will f/u to check on status of set up.

## 2022-05-04 NOTE — TELEPHONE ENCOUNTER
Jorden Weinberg from [facility] called back stating that insurance won't cover CPAP since Titration result indicates need for BIPAP, stating \"CPAP was initiated at 6 cmH20 and increased incrementally to a max of 15 cm H20. Due to patient intolerance of CPAP, BIPAP was initiated at 17/13 cm H20 and increased incrementally to a maximum of 19/16 cmH20.  \"

## 2022-05-04 NOTE — TELEPHONE ENCOUNTER
Spoke with Yanci Catherine at [facility] whom was under the impression that pts study from [facility] 2021 was a CPAP titration, then pt came back in for BIPAP titration due to intolerance. Clarified with Yanci Catherine that the Nov study was an HST, and then pt came back in for a titration. Yanci Catherine is going to look further into this and call me back.

## 2022-05-13 ENCOUNTER — HOSPITAL ENCOUNTER (OUTPATIENT)
Dept: CT IMAGING | Age: 82
Discharge: HOME | End: 2022-05-13
Payer: MEDICARE

## 2022-05-13 DIAGNOSIS — R10.31 RIGHT LOWER QUADRANT PAIN: ICD-10-CM

## 2022-05-13 PROCEDURE — 72192 CT PELVIS W/O DYE: CPT

## 2022-05-16 NOTE — TELEPHONE ENCOUNTER
Spoke with patient states was set up with CPAP on 3/5/22. F/u jed 5/31/22. Received machine through [facility].

## 2022-05-31 ENCOUNTER — TELEPHONE (OUTPATIENT)
Dept: PULMONOLOGY | Age: 82
End: 2022-05-31

## 2022-05-31 ENCOUNTER — OFFICE VISIT (OUTPATIENT)
Dept: PULMONOLOGY | Age: 82
End: 2022-05-31
Payer: MEDICARE

## 2022-05-31 VITALS
SYSTOLIC BLOOD PRESSURE: 159 MMHG | OXYGEN SATURATION: 97 % | HEIGHT: 62 IN | BODY MASS INDEX: 24.48 KG/M2 | HEART RATE: 50 BPM | WEIGHT: 133 LBS | DIASTOLIC BLOOD PRESSURE: 80 MMHG

## 2022-05-31 DIAGNOSIS — G47.33 OSA (OBSTRUCTIVE SLEEP APNEA): Primary | ICD-10-CM

## 2022-05-31 DIAGNOSIS — I10 ESSENTIAL HYPERTENSION: ICD-10-CM

## 2022-05-31 PROCEDURE — G8427 DOCREV CUR MEDS BY ELIG CLIN: HCPCS | Performed by: INTERNAL MEDICINE

## 2022-05-31 PROCEDURE — 1036F TOBACCO NON-USER: CPT | Performed by: INTERNAL MEDICINE

## 2022-05-31 PROCEDURE — 1123F ACP DISCUSS/DSCN MKR DOCD: CPT | Performed by: INTERNAL MEDICINE

## 2022-05-31 PROCEDURE — G8399 PT W/DXA RESULTS DOCUMENT: HCPCS | Performed by: INTERNAL MEDICINE

## 2022-05-31 PROCEDURE — 1090F PRES/ABSN URINE INCON ASSESS: CPT | Performed by: INTERNAL MEDICINE

## 2022-05-31 PROCEDURE — G8420 CALC BMI NORM PARAMETERS: HCPCS | Performed by: INTERNAL MEDICINE

## 2022-05-31 PROCEDURE — 99214 OFFICE O/P EST MOD 30 MIN: CPT | Performed by: INTERNAL MEDICINE

## 2022-05-31 ASSESSMENT — SLEEP AND FATIGUE QUESTIONNAIRES
HOW LIKELY ARE YOU TO NOD OFF OR FALL ASLEEP WHEN YOU ARE A PASSENGER IN A CAR FOR AN HOUR WITHOUT A BREAK: 1
NECK CIRCUMFERENCE (INCHES): 13.5
HOW LIKELY ARE YOU TO NOD OFF OR FALL ASLEEP WHILE SITTING QUIETLY AFTER LUNCH WITHOUT ALCOHOL: 0
HOW LIKELY ARE YOU TO NOD OFF OR FALL ASLEEP WHILE SITTING AND READING: 0
ESS TOTAL SCORE: 2
HOW LIKELY ARE YOU TO NOD OFF OR FALL ASLEEP WHILE SITTING AND TALKING TO SOMEONE: 0
HOW LIKELY ARE YOU TO NOD OFF OR FALL ASLEEP WHILE SITTING INACTIVE IN A PUBLIC PLACE: 0
HOW LIKELY ARE YOU TO NOD OFF OR FALL ASLEEP IN A CAR, WHILE STOPPED FOR A FEW MINUTES IN TRAFFIC: 0
HOW LIKELY ARE YOU TO NOD OFF OR FALL ASLEEP WHILE LYING DOWN TO REST IN THE AFTERNOON WHEN CIRCUMSTANCES PERMIT: 0
HOW LIKELY ARE YOU TO NOD OFF OR FALL ASLEEP WHILE WATCHING TV: 1

## 2022-05-31 NOTE — PROGRESS NOTES
[facility]                                                                  CHIEF COMPLAINT: follow up HOANG         HPI:   CPAP titration were reviewed by me and noted below. Results were dicussed with patient and multiple good questions were answered. Started CPAP. Patient is using CPAP 5-7  hrs/night. Using humidifier. Feels pressure is high. Uses full face mask. No nodding off when driving. Bedtime is 11:15-11;30 pm and rise time is 8 am. Sleep onset is 5 minutes. ESS is 2         From prior visit:   Patient was diagnosed with moderate HOANG 12/18/13. Associated with snoring, witnessed apnea, and hypersomnia. Did not want to get CPAP at that time. Better with her chin strap and when sitting up - 75 degrees due to her back pain. + dry mouth upon awakening. Feels overall better and wondering if she still has it. Interesting in knowing whether she has HOANG or not, if she has might consider CPAP therapy. Bedtime 11-11:30 pm and rise time is 7-7:30 am. Sleep onset 20 minutes. No naps. Old records were reviewed and summarized by me. Past Medical History:   Diagnosis Date    Asthma     Diverticulosis     DJD (degenerative joint disease)     thoracic, lumbar    Hyperlipidemia     Hyperparathyroidism, unspecified ([facility] [address]     Hypertension     Osteopenia     stopped fosamax 11/2014 after being on for 10years    Pneumonia     Renal artery stenosis (HCC)     Sleep apnea     Spina bifida [facility])        Past Surgical History:        Procedure Laterality Date    ANGIOPLASTY      rt renal artery stenosis    BREAST REDUCTION SURGERY  1997    bilateral    COLONOSCOPY  11/18/2008    COLONOSCOPY  5/14/13    bx    DILATION AND CURETTAGE OF UTERUS         Allergies:  is allergic to adhesive tape. Social History:    TOBACCO:   reports that she has never smoked.  She has never used smokeless tobacco.  ETOH:   reports no history of alcohol use.      Family History:       Problem Relation Age of Onset    Cancer Mother 80        duodenal    Heart Disease Father 78    Hypertension Father     Hypertension Brother     Diabetes Brother     Cancer Brother         type 2    Hypertension Brother     Diabetes Brother     Hypertension Brother     Heart Disease Other         AAA    Asthma Neg Hx     Emphysema Neg Hx        Current Medications:    Current Outpatient Medications:     amLODIPine (NORVASC) 5 MG tablet, Take 1 tablet by mouth 2 times daily, Disp: 180 tablet, Rfl: 3    Zinc 25 MG TABS, Take by mouth, Disp: , Rfl:     Glucosamine-Chondroit-Vit C-Mn (GLUCOSAMINE CHONDR 1500 COMPLX PO), Take by mouth, Disp: , Rfl:     naproxen (NAPROSYN) 250 MG tablet, Take 250 mg by mouth 2 times daily (with meals), Disp: , Rfl:     SENSIPAR 30 MG tablet, Take 1 tablet by mouth daily, Disp: , Rfl:     Melatonin 5 MG TABS tablet, Take  by mouth., Disp: , Rfl:     enalapril (VASOTEC) 10 MG tablet, Take 2 tablets by mouth daily. Takes 10 mg in morning, 5 mg in pm, Disp: 30 tablet, Rfl:     vitamin E 1000 UNITS capsule, Take 4,000 Units by mouth daily. , Disp: , Rfl:     Ascorbic Acid (VITAMIN C) 500 MG tablet, Take 500 mg by mouth daily. , Disp: , Rfl:     FISH OIL, by Does not apply route , Disp: , Rfl:     BL GLUCOSAMINE-CHONDROITIN PO, Take  by mouth 2 times daily. , Disp: , Rfl:     simvastatin (ZOCOR) 20 MG tablet, Take 20 mg by mouth nightly.  , Disp: , Rfl:     DULoxetine (CYMBALTA) 30 MG extended release capsule, Take 1 capsule by mouth daily (Patient not taking: Reported on 2/28/2022), Disp: 30 capsule, Rfl: 3    aspirin 81 MG EC tablet, Take 81 mg by mouth every 48 hours. (Patient not taking: Reported on 5/31/2022), Disp: , Rfl:         Objective:   PHYSICAL EXAM:    Blood pressure (!) 159/80, pulse 50, height 5' 2\" (1.575 m), weight 133 lb (60.3 kg), SpO2 97 %.' on RA  Gen: No distress. BMI of 24.33  Eyes: PERRL. No sclera icterus.  No conjunctival injection. ENT: No discharge. Pharynx clear. Mallampati class IV. Neck: Trachea midline. No obvious mass. Neck circumference 13.5\"  Resp: No accessory muscle use. No crackles. No wheezes. No rhonchi. No dullness on percussion. Good air entry. CV: Regular rate. Regular rhythm. No murmur or rub. No edema. GI: Non-tender. Non-distended. No hernia. Skin: Warm and dry. No nodule on exposed extremities. Lymph: No cervical LAD. No supraclavicular LAD. M/S: No cyanosis. No joint deformity. No clubbing. Neuro: Awake. Alert. Moves all four extremities. Psych: Oriented x 3. No anxiety. DATA reviewed by me:   HST 12/18/2013 AHI 19. Desaturation to 82%  CPAP titration 12/21/21 BiPAP 13/9       CPAP data 2019 reviewed by me. Uses   hrs/night with % compliance and AHI of  . <90% pressure of cmH2O, Median pressure of cmH2O, 95th pressure of  cmH2O. CPAP cmH2O      Assessment:       · Moderate HOANG. CPAP 9 cmH2O. feels pressure is high. Optimal compliance per patient's report  · Essential hypertension      Plan:    · Change CPAP 8 cmH2O   · Change to nasal pillow   · Advised to monitor BP daily and notify PCP if uncontrolled  · Obtain CPAP compliance  · Order for CPAP supplies  · Continue CPAP 6-8 hrs at night and during naps. · Replacement of mask, tubing, head straps every 3-6 months or sooner if damaged. · Follow up CPAP compliance and pressure adjustment if needed  · Sleep hygiene  · Avoid sedatives, alcohol and caffeinated drinks at bed time. · No driving motorized vehicles or operating heavy machinery while fatigue, drowsy or sleepy. · Continue blood pressure medications - treatment of HOANG can lower blood pressure by levels that are clinically significant.

## 2022-07-07 ENCOUNTER — OFFICE VISIT (OUTPATIENT)
Dept: FAMILY MEDICINE CLINIC | Age: 82
End: 2022-07-07
Payer: MEDICARE

## 2022-07-07 VITALS
BODY MASS INDEX: 23.96 KG/M2 | HEART RATE: 54 BPM | OXYGEN SATURATION: 98 % | DIASTOLIC BLOOD PRESSURE: 80 MMHG | SYSTOLIC BLOOD PRESSURE: 140 MMHG | WEIGHT: 131 LBS

## 2022-07-07 DIAGNOSIS — Z01.818 PRE-OP EXAM: Primary | ICD-10-CM

## 2022-07-07 DIAGNOSIS — Z01.818 PRE-OP EXAM: ICD-10-CM

## 2022-07-07 LAB
HCT VFR BLD CALC: 40.9 % (ref 36–48)
HEMOGLOBIN: 13.9 G/DL (ref 12–16)
MCH RBC QN AUTO: 32.9 PG (ref 26–34)
MCHC RBC AUTO-ENTMCNC: 34 G/DL (ref 31–36)
MCV RBC AUTO: 96.9 FL (ref 80–100)
PDW BLD-RTO: 13 % (ref 12.4–15.4)
PLATELET # BLD: 175 K/UL (ref 135–450)
PMV BLD AUTO: 8.7 FL (ref 5–10.5)
RBC # BLD: 4.22 M/UL (ref 4–5.2)
WBC # BLD: 7.3 K/UL (ref 4–11)

## 2022-07-07 PROCEDURE — G8427 DOCREV CUR MEDS BY ELIG CLIN: HCPCS | Performed by: NURSE PRACTITIONER

## 2022-07-07 PROCEDURE — 1090F PRES/ABSN URINE INCON ASSESS: CPT | Performed by: NURSE PRACTITIONER

## 2022-07-07 PROCEDURE — 93000 ELECTROCARDIOGRAM COMPLETE: CPT | Performed by: NURSE PRACTITIONER

## 2022-07-07 PROCEDURE — 99214 OFFICE O/P EST MOD 30 MIN: CPT | Performed by: NURSE PRACTITIONER

## 2022-07-07 PROCEDURE — 1123F ACP DISCUSS/DSCN MKR DOCD: CPT | Performed by: NURSE PRACTITIONER

## 2022-07-07 PROCEDURE — G8399 PT W/DXA RESULTS DOCUMENT: HCPCS | Performed by: NURSE PRACTITIONER

## 2022-07-07 PROCEDURE — 1036F TOBACCO NON-USER: CPT | Performed by: NURSE PRACTITIONER

## 2022-07-07 PROCEDURE — G8420 CALC BMI NORM PARAMETERS: HCPCS | Performed by: NURSE PRACTITIONER

## 2022-07-07 ASSESSMENT — PATIENT HEALTH QUESTIONNAIRE - PHQ9
3. TROUBLE FALLING OR STAYING ASLEEP: 1
SUM OF ALL RESPONSES TO PHQ QUESTIONS 1-9: 2
1. LITTLE INTEREST OR PLEASURE IN DOING THINGS: 0
5. POOR APPETITE OR OVEREATING: 0
SUM OF ALL RESPONSES TO PHQ9 QUESTIONS 1 & 2: 1
6. FEELING BAD ABOUT YOURSELF - OR THAT YOU ARE A FAILURE OR HAVE LET YOURSELF OR YOUR FAMILY DOWN: 0
SUM OF ALL RESPONSES TO PHQ QUESTIONS 1-9: 2
4. FEELING TIRED OR HAVING LITTLE ENERGY: 0
7. TROUBLE CONCENTRATING ON THINGS, SUCH AS READING THE NEWSPAPER OR WATCHING TELEVISION: 0
9. THOUGHTS THAT YOU WOULD BE BETTER OFF DEAD, OR OF HURTING YOURSELF: 0
8. MOVING OR SPEAKING SO SLOWLY THAT OTHER PEOPLE COULD HAVE NOTICED. OR THE OPPOSITE, BEING SO FIGETY OR RESTLESS THAT YOU HAVE BEEN MOVING AROUND A LOT MORE THAN USUAL: 0
10. IF YOU CHECKED OFF ANY PROBLEMS, HOW DIFFICULT HAVE THESE PROBLEMS MADE IT FOR YOU TO DO YOUR WORK, TAKE CARE OF THINGS AT HOME, OR GET ALONG WITH OTHER PEOPLE: 0
SUM OF ALL RESPONSES TO PHQ QUESTIONS 1-9: 2
2. FEELING DOWN, DEPRESSED OR HOPELESS: 1
SUM OF ALL RESPONSES TO PHQ QUESTIONS 1-9: 2

## 2022-07-07 NOTE — PROGRESS NOTES
Preoperative Consultation      Juan José Perez  YOB: 1940    Date of Service:  7/7/2022    Vitals:    07/07/22 1346   BP: (!) 140/80   Site: Right Upper Arm   Position: Sitting   Cuff Size: Medium Adult   Pulse: 54   SpO2: 98%   Weight: 131 lb (59.4 kg)      Wt Readings from Last 2 Encounters:   07/07/22 131 lb (59.4 kg)   05/31/22 133 lb (60.3 kg)     BP Readings from Last 3 Encounters:   07/07/22 (!) 140/80   05/31/22 (!) 159/80   02/28/22 (!) 180/78        Chief Complaint   Patient presents with   Rosemarie Gibson Pre-op Exam     cystocopy & possible biopsy/stone removal     Allergies   Allergen Reactions    Adhesive Tape Rash     Outpatient Medications Marked as Taking for the 7/7/22 encounter (Office Visit) with SCAR Swan - CNP   Medication Sig Dispense Refill    amLODIPine (NORVASC) 5 MG tablet Take 1 tablet by mouth 2 times daily 180 tablet 3    Zinc 25 MG TABS Take by mouth      Glucosamine-Chondroit-Vit C-Mn (GLUCOSAMINE CHONDR 1500 COMPLX PO) Take by mouth      naproxen (NAPROSYN) 250 MG tablet Take 250 mg by mouth 2 times daily (with meals)      SENSIPAR 30 MG tablet Take 1 tablet by mouth daily      Melatonin 5 MG TABS tablet Take  by mouth.  enalapril (VASOTEC) 10 MG tablet Take 2 tablets by mouth daily. Takes 10 mg in morning, 5 mg in pm 30 tablet     vitamin E 1000 UNITS capsule Take 4,000 Units by mouth daily.  Ascorbic Acid (VITAMIN C) 500 MG tablet Take 500 mg by mouth daily.  FISH OIL by Does not apply route       BL GLUCOSAMINE-CHONDROITIN PO Take  by mouth 2 times daily.  simvastatin (ZOCOR) 20 MG tablet Take 20 mg by mouth nightly. This patient presents to the office today for a preoperative consultation at the request of surgeon, Dr. Susan Wade, who plans on performing cystoscopy right ureteroscopy on July 12 at [address]. The current problem began 5 months ago, and symptoms have been unchanged with time.   Conservative therapy: N/A.     Planned anesthesia: General   Known anesthesia problems: None   Bleeding risk: No recent or remote history of abnormal bleeding  Personal or FH of DVT/PE: No    Patient objection to receiving blood products: No    Patient Active Problem List   Diagnosis    Essential hypertension    Pure hypercholesterolemia    Pain in both knees    Primary osteoarthritis of knees, bilateral    Chronic renal failure, stage 3 (moderate) (HCC)    Hyperparathyroidism, primary ([address]    Obstructive sleep apnea syndrome    Acute midline thoracic back pain    Moderate obstructive sleep apnea    Snoring    Witnessed episode of apnea    Neck pain    Chronic RLQ pain    HOANG (obstructive sleep apnea)       Past Medical History:   Diagnosis Date    Asthma     Diverticulosis     DJD (degenerative joint disease)     thoracic, lumbar    Hyperlipidemia     Hyperparathyroidism, unspecified ([address]     Hypertension     Osteopenia     stopped fosamax 11/2014 after being on for 10years    Pneumonia     Renal artery stenosis ([address]     Sleep apnea     Spina bifida ([address]      Past Surgical History:   Procedure Laterality Date    ANGIOPLASTY      rt renal artery stenosis    BREAST REDUCTION SURGERY  1997    bilateral    COLONOSCOPY  11/18/2008    COLONOSCOPY  5/14/13    bx    DILATION AND CURETTAGE OF UTERUS       Family History   Problem Relation Age of Onset    Cancer Mother 80        duodenal    Heart Disease Father 78    Hypertension Father     Hypertension Brother     Diabetes Brother     Cancer Brother         type 2    Hypertension Brother     Diabetes Brother     Hypertension Brother     Heart Disease Other         AAA    Asthma Neg Hx     Emphysema Neg Hx      Social History     Socioeconomic History    Marital status:      Spouse name: Not on file    Number of children: Not on file    Years of education: Not on file    Highest education level: Not on file   Occupational History  Occupation: retired     Comment: occupational health   Tobacco Use    Smoking status: Never Smoker    Smokeless tobacco: Never Used   Substance and Sexual Activity    Alcohol use: No    Drug use: No    Sexual activity: Not on file   Other Topics Concern    Not on file   Social History Narrative    Not on file     Social Determinants of Health     Financial Resource Strain:     Difficulty of Paying Living Expenses: Not on file   Food Insecurity:     Worried About [employer] in the Last Year: Not on file    Major of Food in the Last Year: Not on file   Transportation Needs:     Lack of Transportation (Medical): Not on file    Lack of Transportation (Non-Medical): Not on file   Physical Activity:     Days of Exercise per Week: Not on file    Minutes of Exercise per Session: Not on file   Stress:     Feeling of Stress : Not on file   Social Connections:     Frequency of Communication with Friends and Family: Not on file    Frequency of Social Gatherings with Friends and Family: Not on file    Attends [employer] Services: Not on file    Active Member of [employer] or Organizations: Not on file    Attends Club or Organization Meetings: Not on file    Marital Status: Not on file   Intimate Partner Violence:     Fear of Current or Ex-Partner: Not on file    Emotionally Abused: Not on file    Physically Abused: Not on file    Sexually Abused: Not on file   Housing Stability:     Unable to Pay for Housing in the Last Year: Not on file    Number of [place] in the Last Year: Not on file    Unstable Housing in the Last Year: Not on file       Review of Systems  A comprehensive review of systems was negative except for what was noted in the HPI. Physical Exam   Constitutional: She is oriented to person, place, and time. She appears well-developed and well-nourished. No distress. HENT:   Head: Normocephalic and atraumatic.    Mouth/Throat: Uvula is midline, oropharynx is clear and moist and mucous membranes are normal.   Eyes: Conjunctivae and EOM are normal. Pupils are equal, round, and reactive to light. Neck: Trachea normal and normal range of motion. Neck supple. No JVD present. Carotid bruit is not present. No mass and no thyromegaly present. Cardiovascular: Normal rate, regular rhythm, normal heart sounds and intact distal pulses. Exam reveals no gallop and no friction rub. No murmur heard. Pulmonary/Chest: Effort normal and breath sounds normal. No respiratory distress. She has no wheezes. She has no rales. Abdominal: Soft. bowel sounds are normal. She exhibits no distension and no mass. There is no hepatosplenomegaly. No tenderness. Musculoskeletal: She exhibits no edema and no tenderness. Neurological: She is alert and oriented to person, place, and time. She has normal strength. No cranial nerve deficit or sensory deficit. Coordination and gait normal.   Skin: Skin is warm and dry. No rash noted. No erythema. Psychiatric: She has a normal mood and affect. Her behavior is normal.     EKG Interpretation:  normal EKG, normal sinus rhythm, unchanged from previous tracings. Lab Review   No visits with results within 2 Month(s) from this visit. Latest known visit with results is:   Admission on 12/17/2021, Discharged on 12/17/2021   Component Date Value    Ventricular Rate 12/17/2021 80     Atrial Rate 12/17/2021 80     P-R Interval 12/17/2021 148     QRS Duration 12/17/2021 96     Q-T Interval 12/17/2021 364     QTc Calculation (Bazett) 12/17/2021 419     P Axis 12/17/2021 39     R Axis 12/17/2021 -9     T Axis 12/17/2021 43     Diagnosis 12/17/2021 Normal sinus rhythmNormal ECGWhen compared with ECG of 14-MAY-2013 10:46,Vent.  rate has increased BY  26 BPMConfirmed by Kaiser Hoskins MD, [address] ([id number]) on 12/17/2021 7:26:04 AM     WBC 12/17/2021 5.8     RBC 12/17/2021 3.93*    Hemoglobin 12/17/2021 12.7     Hematocrit 12/17/2021 37.6     MCV 12/17/2021 95.6     MCH 12/17/2021 32.2     MCHC 12/17/2021 33.7     RDW 12/17/2021 13.0     Platelets 12/12/2501 198     MPV 12/17/2021 8.2     Neutrophils % 12/17/2021 58.2     Lymphocytes % 12/17/2021 23.0     Monocytes % 12/17/2021 11.1     Eosinophils % 12/17/2021 6.4     Basophils % 12/17/2021 1.3     Neutrophils Absolute 12/17/2021 3.4     Lymphocytes Absolute 12/17/2021 1.3     Monocytes Absolute 12/17/2021 0.6     Eosinophils Absolute 12/17/2021 0.4     Basophils Absolute 12/17/2021 0.1     Sodium 12/17/2021 141     Potassium reflex Magnesi* 12/17/2021 3.9     Chloride 12/17/2021 106     CO2 12/17/2021 26     Anion Gap 12/17/2021 9     Glucose 12/17/2021 87     BUN 12/17/2021 16     CREATININE 12/17/2021 0.9     GFR Non- 12/17/2021 >60     GFR  12/17/2021 >60     Calcium 12/17/2021 10.6     Total Protein 12/17/2021 6.6     Albumin 12/17/2021 4.3     Albumin/Globulin Ratio 12/17/2021 1.9     Total Bilirubin 12/17/2021 0.6     Alkaline Phosphatase 12/17/2021 93     ALT 12/17/2021 34     AST 12/17/2021 32     Troponin 12/17/2021 <0.01      Lab Results   Component Value Date/Time     07/07/2022 02:45 PM    K 4.1 07/07/2022 02:45 PM    K 3.9 12/17/2021 05:30 AM     07/07/2022 02:45 PM    CO2 26 07/07/2022 02:45 PM    BUN 22 07/07/2022 02:45 PM    CREATININE 1.1 07/07/2022 02:45 PM    GLUCOSE 86 07/07/2022 02:45 PM    CALCIUM 10.9 07/07/2022 02:45 PM     Lab Results   Component Value Date/Time    TROPONINI <0.01 12/17/2021 05:30 AM     Lab Results   Component Value Date/Time    WBC 7.3 07/07/2022 02:45 PM    HGB 13.9 07/07/2022 02:45 PM    HCT 40.9 07/07/2022 02:45 PM    MCV 96.9 07/07/2022 02:45 PM     07/07/2022 02:45 PM     No results found for: CHOL, TRIG, HDL, LDLDIRECT        Assessment:       80 y.o. patient with planned surgery as above.     Known risk factors for perioperative complications: Hypertension, Obstructive sleep apnea, Hyperparathyroidism, Renal dysfunction  Current medications which may produce withdrawal symptoms if withheld perioperatively: none      Plan:     1. Preoperative workup as follows: ECG, hemoglobin, hematocrit, electrolytes, creatinine, glucose  2. Change in medication regimen before surgery: Discontinue ASA 7 days before surgery, Discontinue NSAIDs 7 days before surgery; Hold multivitamin, vitamin E, and fish oil 7 days prior. 3. Prophylaxis for cardiac events with perioperative beta-blockers: Currently taking  Atenolol 50 mg (per patient, not on med list). ACC/AHA indications for pre-operative beta-blocker use:    · Vascular surgery with history of postitive stress test  · Intermediate or high risk surgery with history of CAD   · Intermediate or high risk surgery with multiple clinical predictors of CAD- 2 of the following: history of compensated or prior heart failure, history of cerebrovascular disease, DM, or renal insufficiency    Routine administration of higher-dose, long-acting metoprolol in beta-blocker-naïve patients on the day of surgery, and in the absence of dose titration is associated with an overall increase in mortality. Beta-blockers should be started days to weeks prior to surgery and titrated to pulse < 70.  4. Deep vein thrombosis prophylaxis: regimen to be chosen by surgical team  5.  No contraindications to planned surgery  * please note calcium level, and kidney function on labs*   CC to pcp and urologist

## 2022-07-08 DIAGNOSIS — E83.52 HYPERCALCEMIA: Primary | ICD-10-CM

## 2022-07-08 LAB
ANION GAP SERPL CALCULATED.3IONS-SCNC: 11 MMOL/L (ref 3–16)
BUN BLDV-MCNC: 22 MG/DL (ref 7–20)
CALCIUM SERPL-MCNC: 10.9 MG/DL (ref 8.3–10.6)
CHLORIDE BLD-SCNC: 102 MMOL/L (ref 99–110)
CO2: 26 MMOL/L (ref 21–32)
CREAT SERPL-MCNC: 1.1 MG/DL (ref 0.6–1.2)
GFR AFRICAN AMERICAN: 58
GFR NON-AFRICAN AMERICAN: 48
GLUCOSE BLD-MCNC: 86 MG/DL (ref 70–99)
POTASSIUM SERPL-SCNC: 4.1 MMOL/L (ref 3.5–5.1)
SODIUM BLD-SCNC: 139 MMOL/L (ref 136–145)

## 2022-08-01 NOTE — TELEPHONE ENCOUNTER
Patient called with message left for patient to call back to office to schedule on MA schedule for compliance download, or pt can take to DME for download to be done.

## 2022-08-02 NOTE — TELEPHONE ENCOUNTER
Pt returned call and was informed. Pt will call ahead prior to coming so she can be put on the MA schedule for compliance download, as she isn't sure when she will be coming this way.

## 2022-08-12 NOTE — TELEPHONE ENCOUNTER
Patient informed; order faxed to [facility] for pressure change. F/u to make sure pressure changed. CR 6-8 wks after.

## 2022-08-29 ENCOUNTER — OFFICE VISIT (OUTPATIENT)
Dept: FAMILY MEDICINE CLINIC | Age: 82
End: 2022-08-29
Payer: MEDICARE

## 2022-08-29 VITALS
BODY MASS INDEX: 23.41 KG/M2 | SYSTOLIC BLOOD PRESSURE: 138 MMHG | WEIGHT: 128 LBS | HEART RATE: 56 BPM | OXYGEN SATURATION: 98 % | DIASTOLIC BLOOD PRESSURE: 70 MMHG

## 2022-08-29 VITALS
WEIGHT: 128 LBS | DIASTOLIC BLOOD PRESSURE: 70 MMHG | BODY MASS INDEX: 23.55 KG/M2 | HEIGHT: 62 IN | SYSTOLIC BLOOD PRESSURE: 138 MMHG | HEART RATE: 56 BPM

## 2022-08-29 DIAGNOSIS — N18.31 CHRONIC RENAL FAILURE, STAGE 3A (HCC): ICD-10-CM

## 2022-08-29 DIAGNOSIS — G47.33 OBSTRUCTIVE SLEEP APNEA SYNDROME: ICD-10-CM

## 2022-08-29 DIAGNOSIS — E21.0 HYPERPARATHYROIDISM, PRIMARY (HCC): Primary | ICD-10-CM

## 2022-08-29 DIAGNOSIS — M25.561 CHRONIC PAIN OF BOTH KNEES: ICD-10-CM

## 2022-08-29 DIAGNOSIS — Z00.00 MEDICARE ANNUAL WELLNESS VISIT, SUBSEQUENT: Primary | ICD-10-CM

## 2022-08-29 DIAGNOSIS — M25.562 CHRONIC PAIN OF BOTH KNEES: ICD-10-CM

## 2022-08-29 DIAGNOSIS — I10 ESSENTIAL HYPERTENSION: ICD-10-CM

## 2022-08-29 DIAGNOSIS — G89.29 CHRONIC PAIN OF BOTH KNEES: ICD-10-CM

## 2022-08-29 DIAGNOSIS — K64.9 HEMORRHOIDS, UNSPECIFIED HEMORRHOID TYPE: ICD-10-CM

## 2022-08-29 PROCEDURE — G8427 DOCREV CUR MEDS BY ELIG CLIN: HCPCS | Performed by: STUDENT IN AN ORGANIZED HEALTH CARE EDUCATION/TRAINING PROGRAM

## 2022-08-29 PROCEDURE — 1123F ACP DISCUSS/DSCN MKR DOCD: CPT | Performed by: STUDENT IN AN ORGANIZED HEALTH CARE EDUCATION/TRAINING PROGRAM

## 2022-08-29 PROCEDURE — 99214 OFFICE O/P EST MOD 30 MIN: CPT | Performed by: STUDENT IN AN ORGANIZED HEALTH CARE EDUCATION/TRAINING PROGRAM

## 2022-08-29 PROCEDURE — G8420 CALC BMI NORM PARAMETERS: HCPCS | Performed by: STUDENT IN AN ORGANIZED HEALTH CARE EDUCATION/TRAINING PROGRAM

## 2022-08-29 PROCEDURE — G0439 PPPS, SUBSEQ VISIT: HCPCS | Performed by: STUDENT IN AN ORGANIZED HEALTH CARE EDUCATION/TRAINING PROGRAM

## 2022-08-29 PROCEDURE — G8399 PT W/DXA RESULTS DOCUMENT: HCPCS | Performed by: STUDENT IN AN ORGANIZED HEALTH CARE EDUCATION/TRAINING PROGRAM

## 2022-08-29 PROCEDURE — 1090F PRES/ABSN URINE INCON ASSESS: CPT | Performed by: STUDENT IN AN ORGANIZED HEALTH CARE EDUCATION/TRAINING PROGRAM

## 2022-08-29 PROCEDURE — 1036F TOBACCO NON-USER: CPT | Performed by: STUDENT IN AN ORGANIZED HEALTH CARE EDUCATION/TRAINING PROGRAM

## 2022-08-29 RX ORDER — HYDROCORTISONE 10 MG/G
CREAM TOPICAL
Qty: 28 G | Refills: 1 | Status: SHIPPED | OUTPATIENT
Start: 2022-08-29

## 2022-08-29 RX ORDER — ENALAPRIL MALEATE 20 MG/1
20 TABLET ORAL 2 TIMES DAILY
Qty: 180 TABLET | Refills: 1 | Status: SHIPPED | OUTPATIENT
Start: 2022-08-29

## 2022-08-29 RX ORDER — ATENOLOL 50 MG/1
50 TABLET ORAL DAILY
COMMUNITY

## 2022-08-29 SDOH — ECONOMIC STABILITY: FOOD INSECURITY: WITHIN THE PAST 12 MONTHS, YOU WORRIED THAT YOUR FOOD WOULD RUN OUT BEFORE YOU GOT MONEY TO BUY MORE.: NEVER TRUE

## 2022-08-29 SDOH — ECONOMIC STABILITY: FOOD INSECURITY: WITHIN THE PAST 12 MONTHS, THE FOOD YOU BOUGHT JUST DIDN'T LAST AND YOU DIDN'T HAVE MONEY TO GET MORE.: NEVER TRUE

## 2022-08-29 ASSESSMENT — PATIENT HEALTH QUESTIONNAIRE - PHQ9
2. FEELING DOWN, DEPRESSED OR HOPELESS: 1
SUM OF ALL RESPONSES TO PHQ9 QUESTIONS 1 & 2: 2
4. FEELING TIRED OR HAVING LITTLE ENERGY: 0
7. TROUBLE CONCENTRATING ON THINGS, SUCH AS READING THE NEWSPAPER OR WATCHING TELEVISION: 0
3. TROUBLE FALLING OR STAYING ASLEEP: 1
10. IF YOU CHECKED OFF ANY PROBLEMS, HOW DIFFICULT HAVE THESE PROBLEMS MADE IT FOR YOU TO DO YOUR WORK, TAKE CARE OF THINGS AT HOME, OR GET ALONG WITH OTHER PEOPLE: 0
SUM OF ALL RESPONSES TO PHQ QUESTIONS 1-9: 3
8. MOVING OR SPEAKING SO SLOWLY THAT OTHER PEOPLE COULD HAVE NOTICED. OR THE OPPOSITE, BEING SO FIGETY OR RESTLESS THAT YOU HAVE BEEN MOVING AROUND A LOT MORE THAN USUAL: 0
SUM OF ALL RESPONSES TO PHQ QUESTIONS 1-9: 3
9. THOUGHTS THAT YOU WOULD BE BETTER OFF DEAD, OR OF HURTING YOURSELF: 0
5. POOR APPETITE OR OVEREATING: 0
1. LITTLE INTEREST OR PLEASURE IN DOING THINGS: 1
6. FEELING BAD ABOUT YOURSELF - OR THAT YOU ARE A FAILURE OR HAVE LET YOURSELF OR YOUR FAMILY DOWN: 0

## 2022-08-29 ASSESSMENT — SOCIAL DETERMINANTS OF HEALTH (SDOH): HOW HARD IS IT FOR YOU TO PAY FOR THE VERY BASICS LIKE FOOD, HOUSING, MEDICAL CARE, AND HEATING?: NOT VERY HARD

## 2022-08-29 ASSESSMENT — LIFESTYLE VARIABLES
HOW OFTEN DO YOU HAVE A DRINK CONTAINING ALCOHOL: NEVER
HOW MANY STANDARD DRINKS CONTAINING ALCOHOL DO YOU HAVE ON A TYPICAL DAY: PATIENT DOES NOT DRINK

## 2022-08-29 NOTE — PROGRESS NOTES
baldo  were all reviewed and updated as appropriate today. Review of Systems  All other systems reviewed and negative    Physical Exam  Vitals:    08/29/22 1550   BP: 138/70   Pulse:    SpO2:        Assessment:  Encounter Diagnoses   Name Primary? Hyperparathyroidism, primary ([facility] [address] Yes    Obstructive sleep apnea syndrome     Chronic renal failure, stage 3a (HCC)     Essential hypertension     Chronic pain of both knees     Hemorrhoids, unspecified hemorrhoid type        Plan:  1. Hyperparathyroidism, primary [facility])  Following with endocrinology. 2. Obstructive sleep apnea syndrome  Following with Dr. Donovan Alexandra    3. Chronic renal failure, stage 3a [facility])  Following with nephrology    4. Essential hypertension  Decreased atenolol to 25mg BID, increase Enalipril 20mg BID, Decrease norvasc 10mg. Patient with reported dizziness and HR in low 50's or high 40's. Will decrease BB.   - enalapril (VASOTEC) 20 MG tablet; Take 1 tablet by mouth 2 times daily  Dispense: 180 tablet; Refill: 1    5. Chronic pain of both knees  Reports previously doing well with bilateral cortisone injections. Will refer for evaluation  - Rush Narayan MD, Orthopedic Surgery, [facility]    6. Hemorrhoids, unspecified hemorrhoid type  Reports small amount of BRBPR on toilet tissue. Hx of hemmorhoids. Will trial topical steroid. - Hydrocortisone, Perianal, (PROCTO-FLEX) 1 % cream; Apply topically 2 times daily. Dispense: 28 g; Refill: 1    Discussed concern for falls at home and need for additional monitoring through life alert or similar system since she is living alone. No follow-ups on file.

## 2022-08-29 NOTE — PATIENT INSTRUCTIONS
Personalized Preventive Plan for Teresa Stairs - 8/29/2022  Medicare offers a range of preventive health benefits. Some of the tests and screenings are paid in full while other may be subject to a deductible, co-insurance, and/or copay. Some of these benefits include a comprehensive review of your medical history including lifestyle, illnesses that may run in your family, and various assessments and screenings as appropriate. After reviewing your medical record and screening and assessments performed today your provider may have ordered immunizations, labs, imaging, and/or referrals for you. A list of these orders (if applicable) as well as your Preventive Care list are included within your After Visit Summary for your review. Other Preventive Recommendations:    A preventive eye exam performed by an eye specialist is recommended every 1-2 years to screen for glaucoma; cataracts, macular degeneration, and other eye disorders. A preventive dental visit is recommended every 6 months. Try to get at least 150 minutes of exercise per week or 10,000 steps per day on a pedometer . Order or download the FREE \"Exercise & Physical Activity: Your Everyday Guide\" from [employer]. Call 2-599.784.3468 or search [employer] online. You need 8994-0987 mg of calcium and 6995-5981 IU of vitamin D per day. It is possible to meet your calcium requirement with diet alone, but a vitamin D supplement is usually necessary to meet this goal.  When exposed to the sun, use a sunscreen that protects against both UVA and UVB radiation with an SPF of 30 or greater. Reapply every 2 to 3 hours or after sweating, drying off with a towel, or swimming. Always wear a seat belt when traveling in a car. Always wear a helmet when riding a bicycle or motorcycle. Ronquillo Hamming

## 2022-08-29 NOTE — PROGRESS NOTES
Medicare Annual Wellness Visit    January Baker is here for No chief complaint on file. Assessment & Plan   Medicare annual wellness visit, subsequent    Recommendations for Preventive Services Due: see orders and patient instructions/AVS.  Recommended screening schedule for the next 5-10 years is provided to the patient in written form: see Patient Instructions/AVS.     No follow-ups on file. Subjective       Patient's complete Health Risk Assessment and screening values have been reviewed and are found in Flowsheets. The following problems were reviewed today and where indicated follow up appointments were made and/or referrals ordered.     Positive Risk Factor Screenings with Interventions:             General Health and ACP:  General  In general, how would you say your health is?: Good  In the past 7 days, have you experienced any of the following: New or Increased Pain, New or Increased Fatigue, Loneliness, Social Isolation, Stress or Anger?: (!) Yes  Select all that apply: (!) Stress  Do you get the social and emotional support that you need?: (!) No  Do you have a Living Will?: Yes    Advance Directives       Power of  Living Will ACP-Advance Directive ACP-Power of     Not on File Not on File Not on File Not on File          General Health Risk Interventions:  Stress: relaxation techniques discussed    Health Habits/Nutrition:  Physical Activity: Inactive    Days of Exercise per Week: 0 days    Minutes of Exercise per Session: 0 min     Have you lost any weight without trying in the past 3 months?: No  Body mass index: 23.41  Have you seen the dentist within the past year?: Yes  Health Habits/Nutrition Interventions:  Inadequate physical activity:  educational materials provided to promote increased physical activity     Safety:  Do you have working smoke detectors?: Yes  Do you have any tripping hazards - loose or unsecured carpets or rugs?: (!) Yes  Do you have any tripping hazards - clutter in doorways, halls, or stairs?: No  Do you have either shower bars, grab bars, non-slip mats or non-slip surfaces in your shower or bathtub?: Yes  Do all of your stairways have a railing or banister?: Yes  Do you always fasten your seatbelt when you are in a car?: Yes  Safety Interventions:  Home safety tips provided    ADLs:  In the past 7 days, did you need help from others to perform any of the following everyday activities: Eating, dressing, grooming, bathing, toileting, or walking/balance?: No  In the past 7 days, did you need help from others to take care of any of the following: Laundry, housekeeping, banking/finances, shopping, telephone use, food preparation, transportation, or taking medications?: (!) Yes  Select all that apply: (!) Housekeeping, Banking/Finances  ADL Interventions:  Referred to [facility]          Objective   Vitals:    08/29/22 1554   BP: 138/70   Pulse: 56   Weight: 128 lb (58.1 kg)   Height: 5' 2\" (1.575 m)      Body mass index is 23.41 kg/m². Allergies   Allergen Reactions    Adhesive Tape Rash     Prior to Visit Medications    Medication Sig Taking? Authorizing Provider   atenolol (TENORMIN) 50 MG tablet Take 50 mg by mouth daily 1 tablet AM 1 tablet PM  Historical Provider, MD   enalapril (VASOTEC) 20 MG tablet Take 1 tablet by mouth 2 times daily  Ratna Giles DO   Hydrocortisone, Perianal, (PROCTO-FLEX) 1 % cream Apply topically 2 times daily. [place] Augustus Giles DO   amLODIPine (NORVASC) 5 MG tablet Take 1 tablet by mouth 2 times daily  Patient taking differently: Take 5 mg by mouth in the morning and 5 mg in the evening.  Take 2 5mg tablets AM 1 5mg tablet PM.  COREY Gooden MD   Zinc 25 MG TABS Take by mouth  Historical Provider, MD   Glucosamine-Chondroit-Vit C-Mn (GLUCOSAMINE CHONDR 1500 COMPLX PO) Take by mouth  Historical Provider, MD   naproxen (NAPROSYN) 250 MG tablet Take 250 mg by mouth 2 times daily (with meals)  Historical Provider, MD   SENSIPAR 30 MG tablet Take 1 tablet by mouth daily  Historical Provider, MD   Melatonin 5 MG TABS tablet Take  by mouth. Historical Provider, MD   vitamin E 1000 UNITS capsule Take 4,000 Units by mouth daily. Historical Provider, MD   Ascorbic Acid (VITAMIN C) 500 MG tablet Take 500 mg by mouth daily. Historical Provider, MD   [address]  by Does not apply route   Historical Provider, MD   aspirin 81 MG EC tablet Take 81 mg by mouth every 48 hours. Patient not taking: No sig reported  Historical Provider, MD ABREU GLUCOSAMINE-CHONDROITIN PO Take  by mouth 2 times daily. Historical Provider, MD   simvastatin (ZOCOR) 20 MG tablet Take 20 mg by mouth nightly. Historical Provider, MD Dickson (Including outside providers/suppliers regularly involved in providing care):   Patient Care Team:  Aminah Hansen DO as PCP - General (Family Medicine)  Aminah Hansen DO as PCP - [facility] Empaneled Provider  Thierry Dhaliwal MD as Consulting Physician (Pulmonology)     Reviewed and updated this visit:  Tobacco  Allergies  Meds  Med Hx  Surg Hx  Soc Hx  Fam Hx            I, Eliza Louise LPN, 4/78/0342, performed the documented evaluation under the direct supervision of the attending physician. This encounter was performed under myAminah DOs, direct supervision, 8/29/2022.   Aminah Hansen DO

## 2022-08-29 NOTE — PATIENT INSTRUCTIONS
Consider life alert or similar system. Have children help you call.  Number for life alert [phone]    Decrease atenolol to 25mg BID  Increase Enalipril 20mg BID  Decrease norvasc 10mg

## 2022-10-04 NOTE — TELEPHONE ENCOUNTER
Pt called in to office, pt to drop her machine off tomorrow 10/5/22 for download and pick it up tomorrow before the office closes. Pt did not want to wait for machine to be downloaded as her  is in pain and cant wait in the car for long.

## 2022-10-10 ENCOUNTER — OFFICE VISIT (OUTPATIENT)
Dept: ORTHOPEDIC SURGERY | Age: 82
End: 2022-10-10
Payer: MEDICARE

## 2022-10-10 VITALS — HEIGHT: 62 IN | BODY MASS INDEX: 22.08 KG/M2 | WEIGHT: 120 LBS

## 2022-10-10 DIAGNOSIS — M25.562 ACUTE PAIN OF BOTH KNEES: ICD-10-CM

## 2022-10-10 DIAGNOSIS — M17.0 PRIMARY OSTEOARTHRITIS OF KNEES, BILATERAL: Primary | ICD-10-CM

## 2022-10-10 DIAGNOSIS — M25.561 ACUTE PAIN OF BOTH KNEES: ICD-10-CM

## 2022-10-10 PROCEDURE — G8399 PT W/DXA RESULTS DOCUMENT: HCPCS | Performed by: ORTHOPAEDIC SURGERY

## 2022-10-10 PROCEDURE — 20610 DRAIN/INJ JOINT/BURSA W/O US: CPT | Performed by: ORTHOPAEDIC SURGERY

## 2022-10-10 PROCEDURE — 1090F PRES/ABSN URINE INCON ASSESS: CPT | Performed by: ORTHOPAEDIC SURGERY

## 2022-10-10 PROCEDURE — 1036F TOBACCO NON-USER: CPT | Performed by: ORTHOPAEDIC SURGERY

## 2022-10-10 PROCEDURE — G8427 DOCREV CUR MEDS BY ELIG CLIN: HCPCS | Performed by: ORTHOPAEDIC SURGERY

## 2022-10-10 PROCEDURE — G8484 FLU IMMUNIZE NO ADMIN: HCPCS | Performed by: ORTHOPAEDIC SURGERY

## 2022-10-10 PROCEDURE — 99204 OFFICE O/P NEW MOD 45 MIN: CPT | Performed by: ORTHOPAEDIC SURGERY

## 2022-10-10 PROCEDURE — 1123F ACP DISCUSS/DSCN MKR DOCD: CPT | Performed by: ORTHOPAEDIC SURGERY

## 2022-10-10 PROCEDURE — G8420 CALC BMI NORM PARAMETERS: HCPCS | Performed by: ORTHOPAEDIC SURGERY

## 2022-10-10 RX ORDER — ROPIVACAINE HYDROCHLORIDE 5 MG/ML
4 INJECTION, SOLUTION EPIDURAL; INFILTRATION; PERINEURAL ONCE
Status: COMPLETED | OUTPATIENT
Start: 2022-10-10 | End: 2022-10-10

## 2022-10-10 RX ORDER — TRIAMCINOLONE ACETONIDE 40 MG/ML
40 INJECTION, SUSPENSION INTRA-ARTICULAR; INTRAMUSCULAR ONCE
Status: COMPLETED | OUTPATIENT
Start: 2022-10-10 | End: 2022-10-10

## 2022-10-10 RX ADMIN — ROPIVACAINE HYDROCHLORIDE 4 ML: 5 INJECTION, SOLUTION EPIDURAL; INFILTRATION; PERINEURAL at 14:37

## 2022-10-10 RX ADMIN — TRIAMCINOLONE ACETONIDE 40 MG: 40 INJECTION, SUSPENSION INTRA-ARTICULAR; INTRAMUSCULAR at 14:38

## 2022-10-10 NOTE — PROGRESS NOTES
ORTHOPAEDIC SURGERY H&P / CONSULTATION NOTE    Chief complaint:   Chief Complaint   Patient presents with    Knee Pain     Bilateral knee pain  Previous gel injections  No recent imaging previous pt of Dr Prince Robison      History of present illness: The patient is a 80 y.o. female with subjective symptoms of bilateral knee pain. The chief complaint is located at bilateral knees, medial and patellofemoral greater than lateral. Duration of symptoms has been for years. The severity of symptoms is rated at 5/10 pain on intake form. Patient's daughter accompanies her. She states history of known osteoarthritis however with on again off again exacerbations. She states clicking pain with going up and down stairs and has discomfort getting in and out of the car. She done previous viscosupplementation injections she states roughly 2 to 4 years ago which has provided reasonable symptom relief. She does Voltaren topical gel as she is unable to take oral anti-inflammatories. She has not done any recent therapy or physician directed therapy. The patient has tried the below listed items prior to today's consultation for above listed chief complaint.     +   Over-the-counter anti-inflammatories/prescription medication anti-inflammatory.      -   Physical therapy / guided home exercise program -     +   Previous corticosteroid injections viscosupplementation injections    Past medical history:    Past Medical History:   Diagnosis Date    Asthma     Diverticulosis     DJD (degenerative joint disease)     thoracic, lumbar    Hyperlipidemia     Hyperparathyroidism, unspecified ([address]     Hypertension     Osteopenia     stopped fosamax 11/2014 after being on for 10years    Pneumonia     Renal artery stenosis (HCC)     Sleep apnea     Spina bifida ([address]         Past surgical history:    Past Surgical History:   Procedure Laterality Date    ANGIOPLASTY      rt renal artery stenosis    BREAST REDUCTION SURGERY  1997    bilateral COLONOSCOPY  11/18/2008    COLONOSCOPY  5/14/13    bx    DILATION AND CURETTAGE OF UTERUS          Allergies: Allergies   Allergen Reactions    Adhesive Tape Rash         Medications:   Current Outpatient Medications:     diclofenac sodium (VOLTAREN) 1 % GEL, Apply 4 g topically 4 times daily, Disp: 100 g, Rfl: 0    atenolol (TENORMIN) 50 MG tablet, Take 50 mg by mouth daily 1 tablet AM 1 tablet PM, Disp: , Rfl:     enalapril (VASOTEC) 20 MG tablet, Take 1 tablet by mouth 2 times daily, Disp: 180 tablet, Rfl: 1    Hydrocortisone, Perianal, (PROCTO-FLEX) 1 % cream, Apply topically 2 times daily. , Disp: 28 g, Rfl: 1    amLODIPine (NORVASC) 5 MG tablet, Take 1 tablet by mouth 2 times daily (Patient taking differently: Take 5 mg by mouth in the morning and 5 mg in the evening. Take 2 5mg tablets AM 1 5mg tablet PM.), Disp: 180 tablet, Rfl: 3    Zinc 25 MG TABS, Take by mouth, Disp: , Rfl:     Glucosamine-Chondroit-Vit C-Mn (GLUCOSAMINE CHONDR 1500 COMPLX PO), Take by mouth, Disp: , Rfl:     naproxen (NAPROSYN) 250 MG tablet, Take 250 mg by mouth 2 times daily (with meals), Disp: , Rfl:     SENSIPAR 30 MG tablet, Take 1 tablet by mouth daily, Disp: , Rfl:     Melatonin 5 MG TABS tablet, Take  by mouth., Disp: , Rfl:     vitamin E 1000 UNITS capsule, Take 4,000 Units by mouth daily. , Disp: , Rfl:     Ascorbic Acid (VITAMIN C) 500 MG tablet, Take 500 mg by mouth daily. , Disp: , Rfl:     FISH OIL, by Does not apply route , Disp: , Rfl:     BL GLUCOSAMINE-CHONDROITIN PO, Take  by mouth 2 times daily. , Disp: , Rfl:     simvastatin (ZOCOR) 20 MG tablet, Take 20 mg by mouth nightly.  , Disp: , Rfl:     aspirin 81 MG EC tablet, Take 81 mg by mouth every 48 hours. (Patient not taking: Reported on 10/10/2022), Disp: , Rfl:      Social history: Denies IV drug use.     Social History     Socioeconomic History    Marital status:      Spouse name: Not on file    Number of children: Not on file    Years of education: Not on file    Highest education level: Not on file   Occupational History    Occupation: retired     Comment: occupational health   Tobacco Use    Smoking status: Never    Smokeless tobacco: Never   Substance and Sexual Activity    Alcohol use: No    Drug use: No    Sexual activity: Not on file   Other Topics Concern    Not on file   Social History Narrative    Not on file     Social Determinants of Health     Financial Resource Strain: Low Risk     Difficulty of Paying Living Expenses: Not very hard   Food Insecurity: No Food Insecurity    Worried About Running Out of Food in the Last Year: Never true    Ran Out of Food in the Last Year: Never true   Transportation Needs: Not on file   Physical Activity: Inactive    Days of Exercise per Week: 0 days    Minutes of Exercise per Session: 0 min   Stress: Not on file   Social Connections: Not on file   Intimate Partner Violence: Not on file   Housing Stability: Not on file     Tobacco use. Social History     Tobacco Use   Smoking Status Never   Smokeless Tobacco Never     Employment: Noncontributory    Workers compensation claim: Noncontributory    Review of systems: Patient denies any fevers chills chest pain shortness of breath nausea vomiting significant weight loss any change in voiding or bowel movements. Patient denies any significant numbness or tingling at baseline as it relates to this presenting symptom/chief complaint. The patient denies any significant problems with skin or any significant allergies. Physical examination:  Body mass index is 21.95 kg/m².   AAOx3, NCAT  EOMI  MMM  RR  Unlabored breathing, no wheezing  Skin intact BUE and BLE, warm and moist  Bilateral lower extremity examination specific to subjective symptoms  Exam Right Lower Extremity  Negative effusion, 8/120/0 active ROM (E/F/Lag), same P assive ROM (E/F/Lag), negative anterior Drawer, negative posterior Drawer,   Stable varus/valgus at 0 and 30? however unable to correct to neutral with acute on chronic exacerbations over the last several years. Additional time was taken to review previous notes by Dr. Baylee Camargo where previous viscosupplementation injection therapy had been used. This was then 2 years ago in October 2020 having finished Euflexxa. She states good relief with this. I am somewhat surprised given the amount of severe osteoarthritis present. I reviewed with her consideration for continued conservative care having reviewed with her consideration for total knee replacements and she is not interested in surgery. I reviewed with her continuing Voltaren topical gel which she will continue to do with the both knees. She wears neoprene copper knee sleeves and these may continue as well for symptomatic relief as she wishes  -Physician directed physical therapy program was printed out and given to the patient to work on KENA knee reconditioning and strengthening.  -I recommended low impact activity such as elliptical stationary bike swimming and walking.  -As the patient does have end-stage severe osteoarthritis which radiographic progression in comparison to films seen roughly 4 to 6 years ago, I do not advocate for viscosupplementation injection therapy at this time. She seems to have had an acute on chronic exacerbation so I offered a corticosteroid injection in the bilateral knees. Understand the risks and benefits of this, the patient wished to proceed. --The patient was educated to the risks (infection and skin blanching to name a few) and benefits of the injection and understanding these risks and benefits, the patient wished to proceed and a verbal consent was obtained.   If the patient verbalized the presence of diabetes or rheumatologic condition on chronic immunosuppresseants as a comorbidity upon direct questioning, an additional discussion was had detailing the potential increased risk of infection and potential increase in FSBG and to monitor FSBG and adjust medications as needed.  -After sterile prep of the bilateral knee, a 5 cc corticosteroid injection (4cc ropivicaine and 1cc kenolog 40) was administered into the intra-articular space bilateral knees (with 1 injection the right knee being performed by myself in the left knee injection being provided by the resident with direct supervision and visualization under my guidance and care). The patient tolerated the procedure well and started to have immediate improvement in pain/symptoms.  -Should the patient have return of pain within the next 3 months, consideration for viscosupplementation injection therapy for bilateral knee treatment of knee osteoarthritis. She will contact the office in January 2023 for consideration of this and then follow-up thereafter for the injections should she wish. -All questions answered to the patient's satisfaction and the patient expressed understanding and agreement with the above listed treatment plan  -Follow up in 3 months as needed  -Thank you for the clinical consultation and allowing me to participate in the patient's care. Electronically signed by Magalie Reese MD on 10/10/22 at 5:11 PM EDT         Magalie Reese MD       Orthopaedic Surgery-Sports Medicine        Disclaimer: This note was dictated with voice recognition software. Though review and correction are routinely performed, please contact the office/medical records for any errors requiring correction.

## 2022-12-08 ENCOUNTER — TELEPHONE (OUTPATIENT)
Dept: FAMILY MEDICINE CLINIC | Age: 82
End: 2022-12-08

## 2022-12-08 NOTE — TELEPHONE ENCOUNTER
----- Message from Zakia Breaux sent at 12/8/2022 11:44 AM EST -----  Subject: Appointment Request    Reason for Call: Established Patient Appointment needed: Routine Existing   Condition Follow Up    QUESTIONS    Reason for appointment request? Available appointments did not meet   patient need     Additional Information for Provider? Pt called to schedule appt for   abdominal pain. Pt was seen in office for it in the past and stated pain   is still there. Pt was advised to see urologist and they were not able to   give pt a diagnosis. Pt would like to be seen for this pain. Pt stated the   pain is not worse since last appt but is also not going away. Pain is   about a 6 or 7 for patient when she is experiencing the pain. No appts for   2 weeks pt like to be seen sooner.    ---------------------------------------------------------------------------  --------------  Imani Neri PXCX  [phone]; OK to leave message on voicemail  ---------------------------------------------------------------------------  --------------  SCRIPT ANSWERS  VINID Screen: Gema Patient

## 2022-12-21 NOTE — TELEPHONE ENCOUNTER
Pt called into location to let us know she will bring her machine to office tomorrow around 12 as she has another appt in [place] @ 1. Pt will drop her machine off at office while she is at other appt, and pick it up afterwards.  Adding to MA schedule

## 2022-12-22 ENCOUNTER — OFFICE VISIT (OUTPATIENT)
Dept: FAMILY MEDICINE CLINIC | Age: 82
End: 2022-12-22

## 2022-12-22 VITALS
HEART RATE: 69 BPM | WEIGHT: 123.2 LBS | DIASTOLIC BLOOD PRESSURE: 60 MMHG | SYSTOLIC BLOOD PRESSURE: 140 MMHG | OXYGEN SATURATION: 98 % | BODY MASS INDEX: 22.53 KG/M2

## 2022-12-22 DIAGNOSIS — R10.31 CHRONIC RLQ PAIN: Primary | ICD-10-CM

## 2022-12-22 DIAGNOSIS — G89.29 CHRONIC RLQ PAIN: Primary | ICD-10-CM

## 2022-12-22 NOTE — PROGRESS NOTES
Patient: Heath Farmer is a [de-identified] y.o. female who presents today with the following Chief Complaint(s):  Chief Complaint   Patient presents with    Abdominal Pain         HPI    HOANG  Now using CPAP, following with      Hyperparathyroidism  Following with Dr. Marilia Oh  On Sensipar     CKD 3  Follows with nephrology     RLQ pain  Reports can occur every day or be occurring every day for a few days. Can tell this pain changes with gas, enema, or bowel movement. Reports that she has chronic issues with a small amount of fecal incontinence when going out for the day and therefor will give a small daily enema to help clean out any stool within rectal vault prior to leaving. Current Outpatient Medications   Medication Sig Dispense Refill    diclofenac sodium (VOLTAREN) 1 % GEL Apply 4 g topically 4 times daily 100 g 0    atenolol (TENORMIN) 50 MG tablet Take 50 mg by mouth daily 1 tablet AM 1 tablet PM      enalapril (VASOTEC) 20 MG tablet Take 1 tablet by mouth 2 times daily 180 tablet 1    Hydrocortisone, Perianal, (PROCTO-FLEX) 1 % cream Apply topically 2 times daily. 28 g 1    amLODIPine (NORVASC) 5 MG tablet Take 1 tablet by mouth 2 times daily (Patient taking differently: Take 5 mg by mouth in the morning and 5 mg in the evening. Take 2 5mg tablets AM 1 5mg tablet PM.) 180 tablet 3    Zinc 25 MG TABS Take by mouth      Glucosamine-Chondroit-Vit C-Mn (GLUCOSAMINE CHONDR 1500 COMPLX PO) Take by mouth      naproxen (NAPROSYN) 250 MG tablet Take 250 mg by mouth 2 times daily (with meals)      SENSIPAR 30 MG tablet Take 1 tablet by mouth daily      Melatonin 5 MG TABS tablet Take  by mouth.      vitamin E 1000 UNITS capsule Take 4,000 Units by mouth daily. Ascorbic Acid (VITAMIN C) 500 MG tablet Take 500 mg by mouth daily. FISH OIL by Does not apply route       BL GLUCOSAMINE-CHONDROITIN PO Take  by mouth 2 times daily. simvastatin (ZOCOR) 20 MG tablet Take 20 mg by mouth nightly. aspirin 81 MG EC tablet Take 81 mg by mouth every 48 hours. (Patient not taking: No sig reported)       No current facility-administered medications for this visit. Patient's past medical history, surgical history, family history, medications,  andallergies  were all reviewed and updated as appropriate today. Review of Systems  All other systems reviewed and negative    Physical Exam  Vitals reviewed. Constitutional:       Appearance: Normal appearance. HENT:      Head: Normocephalic and atraumatic. Cardiovascular:      Rate and Rhythm: Normal rate and regular rhythm. Pulmonary:      Effort: Pulmonary effort is normal.      Breath sounds: Normal breath sounds. Abdominal:      General: Bowel sounds are normal. There is no distension. Palpations: There is no mass. Tenderness: There is no guarding or rebound. Hernia: No hernia is present. Neurological:      General: No focal deficit present. Mental Status: She is alert and oriented to person, place, and time. Psychiatric:         Behavior: Behavior normal.         Thought Content: Thought content normal.     Vitals:    12/22/22 1552   BP: (!) 140/60   Pulse: 69   SpO2: 98%       Assessment:  Encounter Diagnosis   Name Primary? Chronic RLQ pain Yes       Plan:  1. Chronic RLQ pain  Exam benign today. Discussed recommendation to avoid frequent use of enema's and that given RLQ pain as well as chronic difficulty with stool incontinence will refer to GI for further evaluation. May also consider pelvic floor PT for further treatment if no GI cause identified. Recommend use of OTC fiber supplementation daily, adequate water intake to help with chornic bowel issues. - Katie Anguiano MD, Gastroenterology, [facility]      No follow-ups on file.

## 2022-12-26 RX ORDER — AMLODIPINE BESYLATE 5 MG/1
TABLET ORAL
Qty: 180 TABLET | Refills: 3 | OUTPATIENT
Start: 2022-12-26

## 2022-12-27 NOTE — TELEPHONE ENCOUNTER
Liz Dodd from [address] returned call stating that they didn't get the pressure change order. Printed and refaxed. Will follow up.

## 2022-12-30 NOTE — TELEPHONE ENCOUNTER
Vikram Jama with [facility] returned vm, stated RT had left a message for pt 12/29/2022 as pt will need appt to complete pressure change.

## 2023-01-07 DIAGNOSIS — I10 ESSENTIAL HYPERTENSION: ICD-10-CM

## 2023-01-07 NOTE — TELEPHONE ENCOUNTER
Refill Request     CONFIRM preferrred pharmacy with the patient. If Mail Order Rx - Pend for 90 day refill. Last Seen: Last Seen Department: 12/22/2022  Last Seen by PCP: 12/22/2022    Last Written: 8/29/2022    If no future appointment scheduled, route STAFF MESSAGE with patient name to the [facility] for scheduling. Next Appointment:   Future Appointments   Date Time Provider Bill Schwartz   2/28/2023  1:30 PM DO RUBENS Pettit  [place] - [facility]   6/1/2023  1:45 PM Jose Fregoso MD [facility]       Message sent to [address] [place] [address] to schedule appt with patient?   NO      Requested Prescriptions     Pending Prescriptions Disp Refills    enalapril (VASOTEC) 20 MG tablet [Pharmacy Med Name: ENALAPRIL MALEATE 20 MG TAB] 180 tablet 1     Sig: TAKE ONE TABLET BY MOUTH TWICE A DAY

## 2023-01-09 RX ORDER — ENALAPRIL MALEATE 20 MG/1
TABLET ORAL
Qty: 180 TABLET | Refills: 1 | Status: SHIPPED | OUTPATIENT
Start: 2023-01-09

## 2023-02-23 RX ORDER — AMLODIPINE BESYLATE 5 MG/1
TABLET ORAL
Qty: 180 TABLET | Refills: 3 | OUTPATIENT
Start: 2023-02-23

## 2023-02-24 RX ORDER — AMLODIPINE BESYLATE 5 MG/1
TABLET ORAL
Qty: 180 TABLET | Refills: 3 | OUTPATIENT
Start: 2023-02-24

## 2023-02-24 NOTE — TELEPHONE ENCOUNTER
Refill Request     CONFIRM preferrred pharmacy with the patient. If Mail Order Rx - Pend for 90 day refill. Last Seen: Last Seen Department: 12/22/2022    Last Seen by PCP: 12/22/2022    Last Written: 12/14/21 180 tablet 3 refills    If no future appointment scheduled, route STAFF MESSAGE with patient name to the [facility] for scheduling. Next Appointment: 2/28/23  Future Appointments   Date Time Provider Bill Schwartz   2/28/2023  1:30 PM DO RUBENS Russell  [place] - [facility]   6/1/2023  1:45 PM MD Jenni Hilton [facility]       Message sent to [address] [place] [address] to schedule appt with patient? NO      Requested Prescriptions     Pending Prescriptions Disp Refills    amLODIPine (NORVASC) 5 MG tablet 180 tablet 3     Sig: Take 1 tablet by mouth in the morning and 1 tablet in the evening.

## 2023-02-27 RX ORDER — AMLODIPINE BESYLATE 5 MG/1
5 TABLET ORAL 2 TIMES DAILY
Qty: 180 TABLET | Refills: 1 | Status: SHIPPED | OUTPATIENT
Start: 2023-02-27

## 2023-02-28 ENCOUNTER — OFFICE VISIT (OUTPATIENT)
Dept: FAMILY MEDICINE CLINIC | Age: 83
End: 2023-02-28

## 2023-02-28 ENCOUNTER — TELEPHONE (OUTPATIENT)
Dept: ORTHOPEDIC SURGERY | Age: 83
End: 2023-02-28

## 2023-02-28 VITALS
WEIGHT: 121.8 LBS | HEART RATE: 61 BPM | DIASTOLIC BLOOD PRESSURE: 60 MMHG | SYSTOLIC BLOOD PRESSURE: 150 MMHG | OXYGEN SATURATION: 99 % | BODY MASS INDEX: 22.28 KG/M2

## 2023-02-28 DIAGNOSIS — M17.0 PRIMARY OSTEOARTHRITIS OF KNEES, BILATERAL: Chronic | ICD-10-CM

## 2023-02-28 DIAGNOSIS — I10 ESSENTIAL HYPERTENSION: ICD-10-CM

## 2023-02-28 DIAGNOSIS — N18.31 CHRONIC RENAL FAILURE, STAGE 3A (HCC): ICD-10-CM

## 2023-02-28 DIAGNOSIS — L60.0 INGROWN NAIL: Primary | ICD-10-CM

## 2023-02-28 DIAGNOSIS — E21.0 HYPERPARATHYROIDISM, PRIMARY (HCC): ICD-10-CM

## 2023-02-28 DIAGNOSIS — G47.33 OBSTRUCTIVE SLEEP APNEA SYNDROME: ICD-10-CM

## 2023-02-28 DIAGNOSIS — M17.0 PRIMARY OSTEOARTHRITIS OF KNEES, BILATERAL: Primary | ICD-10-CM

## 2023-02-28 ASSESSMENT — ANXIETY QUESTIONNAIRES
1. FEELING NERVOUS, ANXIOUS, OR ON EDGE: 1
2. NOT BEING ABLE TO STOP OR CONTROL WORRYING: 0
GAD7 TOTAL SCORE: 3
5. BEING SO RESTLESS THAT IT IS HARD TO SIT STILL: 0
IF YOU CHECKED OFF ANY PROBLEMS ON THIS QUESTIONNAIRE, HOW DIFFICULT HAVE THESE PROBLEMS MADE IT FOR YOU TO DO YOUR WORK, TAKE CARE OF THINGS AT HOME, OR GET ALONG WITH OTHER PEOPLE: NOT DIFFICULT AT ALL
7. FEELING AFRAID AS IF SOMETHING AWFUL MIGHT HAPPEN: 0
6. BECOMING EASILY ANNOYED OR IRRITABLE: 1
3. WORRYING TOO MUCH ABOUT DIFFERENT THINGS: 1
4. TROUBLE RELAXING: 0

## 2023-02-28 ASSESSMENT — PATIENT HEALTH QUESTIONNAIRE - PHQ9
SUM OF ALL RESPONSES TO PHQ QUESTIONS 1-9: 1
1. LITTLE INTEREST OR PLEASURE IN DOING THINGS: 0
2. FEELING DOWN, DEPRESSED OR HOPELESS: 1
SUM OF ALL RESPONSES TO PHQ9 QUESTIONS 1 & 2: 1
SUM OF ALL RESPONSES TO PHQ QUESTIONS 1-9: 1

## 2023-02-28 NOTE — PROGRESS NOTES
Patient: Lizzeth Moody is a [de-identified] y.o. female who presents today with the following Chief Complaint(s):  Chief Complaint   Patient presents with    Follow-up         HPI    HOANG  Now using CPAP, following with      Hyperparathyroidism  Following with Dr. Grazyna Mathias  On Sensipar     CKD 3  Follows with nephrology, Dr. Grazyna Mathias    HTN  Amlodipine 5mg  Enalipril 20mg   Monitoring BP at home 848'X systolic. Reports she always has white coat hypertension. HLD  Zocor    Dry hands  Recommend vasoline nightly    Bilateral knee pain  Has previously had syndisc injections  Had steroid injection which did not help much  Planning for syndisc injections  Following with Dr. Mya Ramírez    History of hemmorhoids    Reports that right eye will be blurry if reading for any period of time. Current Outpatient Medications   Medication Sig Dispense Refill    amLODIPine (NORVASC) 5 MG tablet Take 1 tablet by mouth in the morning and at bedtime 180 tablet 1    enalapril (VASOTEC) 20 MG tablet TAKE ONE TABLET BY MOUTH TWICE A  tablet 1    diclofenac sodium (VOLTAREN) 1 % GEL Apply 4 g topically 4 times daily 100 g 0    atenolol (TENORMIN) 50 MG tablet Take 50 mg by mouth daily 1 tablet AM 1 tablet PM      Hydrocortisone, Perianal, (PROCTO-FLEX) 1 % cream Apply topically 2 times daily. 28 g 1    Zinc 25 MG TABS Take by mouth      Glucosamine-Chondroit-Vit C-Mn (GLUCOSAMINE CHONDR 1500 COMPLX PO) Take by mouth      naproxen (NAPROSYN) 250 MG tablet Take 250 mg by mouth 2 times daily (with meals)      SENSIPAR 30 MG tablet Take 1 tablet by mouth daily      Melatonin 5 MG TABS tablet Take  by mouth.      vitamin E 1000 UNITS capsule Take 4,000 Units by mouth daily. Ascorbic Acid (VITAMIN C) 500 MG tablet Take 500 mg by mouth daily. FISH OIL by Does not apply route       BL GLUCOSAMINE-CHONDROITIN PO Take  by mouth 2 times daily. simvastatin (ZOCOR) 20 MG tablet Take 20 mg by mouth nightly.         aspirin 81 MG EC tablet Take 81 mg by mouth every 48 hours. (Patient not taking: No sig reported)       No current facility-administered medications for this visit. Patient's past medical history, surgical history, family history, medications,  andallergies  were all reviewed and updated as appropriate today. Review of Systems  All other systems reviewed and negative    Physical Exam  Vitals reviewed. Constitutional:       Appearance: Normal appearance. HENT:      Head: Normocephalic and atraumatic. Cardiovascular:      Rate and Rhythm: Normal rate and regular rhythm. Pulmonary:      Effort: Pulmonary effort is normal.      Breath sounds: Normal breath sounds. Neurological:      General: No focal deficit present. Mental Status: She is alert and oriented to person, place, and time. Psychiatric:         Behavior: Behavior normal.         Thought Content: Thought content normal.     Vitals:    02/28/23 1331   BP: (!) 150/60   Pulse: 61   SpO2: 99%       Assessment:  Encounter Diagnoses   Name Primary? Hyperparathyroidism, primary ([address]     Chronic renal failure, stage 3a ([address]     Ingrown nail Yes    Essential hypertension     Obstructive sleep apnea syndrome     Primary osteoarthritis of knees, bilateral        Plan:  1. Hyperparathyroidism, primary [facility])  Following with endocrinology. Last A1c 10.9. Due for repeat which is ordered    2. Chronic renal failure, stage 3a ([address]  Gfr 48 on last check. Recheck ordered in system. Following with nephrology    3. Ingrown nail  Thickened, spooning 1st nail on left foot. Becoming painful. Will refer to podiatry. - SOFY Norton DPM, Podiatry, [facility]    4. Essential hypertension  Elevated in office but does check routinely at home and reports well controlled. Discusssed risks of untreated HTN with kidney disease. 5. Obstructive sleep apnea syndrome  Following with Dr. Nancy Panda.      6. Primary osteoarthritis of knees, bilateral  Following with Dr. Zavala Mariana        No follow-ups on file.

## 2023-02-28 NOTE — TELEPHONE ENCOUNTER
Surgery and/or Procedure Scheduling     Contact Name: Keturah Bond  Surgical/Procedure Request: GEL SHOTS   Patient Contact Number: [phone]     Pt WOULD LIKE TO GET THE GEL SHOTS. THE CORTISONE INJECTIONS GIVEN IN OCT 22 DIDN'T WORK AND SHE IS READY FOR THE GEL SHOTS. PLEASE CALL Pt TO DISCUSS THE STEPS NECESSARY AND WHEN SHE IS ABLE TO SCHEDULE HER APPTS.

## 2023-02-28 NOTE — TELEPHONE ENCOUNTER
Per Dr. Hunter Chopra visit note 10/10/2022 I have submitted for prior authorization. Patient notified I will be in touch once we hear back from her insurance.

## 2023-03-04 PROBLEM — M54.6 ACUTE MIDLINE THORACIC BACK PAIN: Status: RESOLVED | Noted: 2021-10-08 | Resolved: 2023-03-04

## 2023-03-16 DIAGNOSIS — M17.0 PRIMARY OSTEOARTHRITIS OF BOTH KNEES: Primary | ICD-10-CM

## 2023-03-17 ENCOUNTER — TELEPHONE (OUTPATIENT)
Dept: FAMILY MEDICINE CLINIC | Age: 83
End: 2023-03-17

## 2023-03-17 NOTE — TELEPHONE ENCOUNTER
Patient called back, stated yes she discussed this with Dr. Annabelle Childers. Wanted to thank you for reviewing that.

## 2023-03-21 ENCOUNTER — TELEPHONE (OUTPATIENT)
Dept: ORTHOPEDIC SURGERY | Age: 83
End: 2023-03-21

## 2023-03-21 NOTE — TELEPHONE ENCOUNTER
Spoke to patient and let them know that [address] injections have been approved for their BILATERAL knee. Scheduled patient for those injections.

## 2023-04-06 ENCOUNTER — OFFICE VISIT (OUTPATIENT)
Dept: ORTHOPEDIC SURGERY | Age: 83
End: 2023-04-06

## 2023-04-06 VITALS — WEIGHT: 121 LBS | HEIGHT: 62 IN | BODY MASS INDEX: 22.26 KG/M2

## 2023-04-06 DIAGNOSIS — M17.0 PRIMARY OSTEOARTHRITIS OF KNEES, BILATERAL: Primary | ICD-10-CM

## 2023-04-06 NOTE — PROGRESS NOTES
FOLLOW UP ORTHOPAEDIC NOTE    The patient follows up today for reevaluation of bilateral knees. The patient states she is here for viscosupplementation injection therapy as had been previously discussed. She states the corticosteroid injection did not offer significant symptom relief. Previous viscosupplementation injection therapy had done a good job for her and she wishes to proceed with this. PE:  AAOx3  RR  Unlabored breathing  Skin warm and moist  Focused physical examination of the bilateral knees  Unchanged exam with regard to range of motion and stability. No erythema, no effusion     Diagnosis Orders   1. Primary osteoarthritis of knees, bilateral  hyaluronan (ORTHOVISC) 30 MG/2ML injection 60 mg    70520 - [place] DRAIN/INJECT LARGE JOINT/BURSA        Assessment and plan: [de-identified] female with continued subjective symptoms of bilateral knee pain with known, correlating diagnosis of lateral knee osteoarthritis. -Time of 16 minutes was spent coordinating and discussing the clinical findings and diagnostic imaging results as they pertain to the patient's presenting subjective symptoms.  -I had a pleasant discussion with the patient today. I reviewed with her that currently we can consider viscosupplementation injection therapy has continued treatment of knee osteoarthritis. She self-reports good relief with the previous administration by outside provider. Understand the risk and benefits of bilateral knee viscosupplementation injection therapy she wishes to proceed. --The patient was educated to the risks and benefits of the injection series and understanding these risks and benefits, the patient wished to proceed and a verbal consent was obtained. After sterile prep of the bilateral knee, a 2cc viscosupplementation injection (Lot #[id number]) was administered into the intra articular space. The patient tolerated the procedure well.  -Continue activity modification to include low impact.   Elliptical

## 2023-04-20 ENCOUNTER — OFFICE VISIT (OUTPATIENT)
Dept: ORTHOPEDIC SURGERY | Age: 83
End: 2023-04-20

## 2023-04-20 DIAGNOSIS — M17.0 PRIMARY OSTEOARTHRITIS OF KNEES, BILATERAL: Primary | ICD-10-CM

## 2023-04-20 NOTE — PROGRESS NOTES
FOLLOW UP ORTHOPAEDIC NOTE    The patient follows up today for reevaluation of bilateral knee pain. The patient states 4/10 pain pain as listed on intake form. She is pleased with the overall results of the procedures thus far to include viscosupplementation #2/3. She states overall 50 percentage of relief. She wishes to complete number 3/3 injection therapy    PE:  AAOx3  RR  Unlabored breathing  Skin warm and moist  Focused physical examination of the bilateral knee  No erythema or effusion     Diagnosis Orders   1. Primary osteoarthritis of knees, bilateral  hyaluronan (ORTHOVISC) 30 MG/2ML injection 60 mg    [address] - [place] DRAIN/INJECT LARGE JOINT/BURSA        Assessment and plan: [de-identified] female with continued subjective symptoms of bilateral knee pain with known, correlating diagnosis of bilateral knee osteoarthritis. -Time of 12 minutes was spent coordinating and discussing the clinical findings and diagnostic imaging results as they pertain to the patient's presenting subjective symptoms.  -I had a pleasant discussion with the patient today. I reviewed with her that currently her clinical examination and her subjective symptoms are improved based upon her viscosupplementation injection therapy. Understand the risk and benefits of continuing the injection therapy to complete the series at 3/3, she wishes to proceed  --The patient was educated to the risks and benefits of the injection series and understanding these risks and benefits, the patient wished to proceed and a verbal consent was obtained. After sterile prep of the bilateral knee, a 2 to cc viscosupplementation injection (Lot #[id number]) was administered into the intra articular space.   The patient tolerated the procedure well.  -Low impact activity recommended to include elliptical stationary bike swimming and walking  -OTC Tylenol Voltaren topical gel as needed pain  -Patient may follow-up in 6 months time for repeat viscosupplementation injection

## 2023-06-01 ENCOUNTER — OFFICE VISIT (OUTPATIENT)
Dept: PULMONOLOGY | Age: 83
End: 2023-06-01
Payer: MEDICARE

## 2023-06-01 VITALS
DIASTOLIC BLOOD PRESSURE: 67 MMHG | BODY MASS INDEX: 23.55 KG/M2 | HEART RATE: 57 BPM | SYSTOLIC BLOOD PRESSURE: 139 MMHG | WEIGHT: 128 LBS | HEIGHT: 62 IN | OXYGEN SATURATION: 98 % | TEMPERATURE: 98.4 F

## 2023-06-01 DIAGNOSIS — G47.33 OSA (OBSTRUCTIVE SLEEP APNEA): Primary | ICD-10-CM

## 2023-06-01 DIAGNOSIS — I10 HYPERTENSION, UNSPECIFIED TYPE: ICD-10-CM

## 2023-06-01 PROCEDURE — G8427 DOCREV CUR MEDS BY ELIG CLIN: HCPCS | Performed by: INTERNAL MEDICINE

## 2023-06-01 PROCEDURE — 1036F TOBACCO NON-USER: CPT | Performed by: INTERNAL MEDICINE

## 2023-06-01 PROCEDURE — 99214 OFFICE O/P EST MOD 30 MIN: CPT | Performed by: INTERNAL MEDICINE

## 2023-06-01 PROCEDURE — G8420 CALC BMI NORM PARAMETERS: HCPCS | Performed by: INTERNAL MEDICINE

## 2023-06-01 PROCEDURE — 1123F ACP DISCUSS/DSCN MKR DOCD: CPT | Performed by: INTERNAL MEDICINE

## 2023-06-01 PROCEDURE — 3075F SYST BP GE 130 - 139MM HG: CPT | Performed by: INTERNAL MEDICINE

## 2023-06-01 PROCEDURE — G8399 PT W/DXA RESULTS DOCUMENT: HCPCS | Performed by: INTERNAL MEDICINE

## 2023-06-01 PROCEDURE — 3078F DIAST BP <80 MM HG: CPT | Performed by: INTERNAL MEDICINE

## 2023-06-01 PROCEDURE — 1090F PRES/ABSN URINE INCON ASSESS: CPT | Performed by: INTERNAL MEDICINE

## 2023-06-01 ASSESSMENT — SLEEP AND FATIGUE QUESTIONNAIRES
HOW LIKELY ARE YOU TO NOD OFF OR FALL ASLEEP WHILE WATCHING TV: 0
HOW LIKELY ARE YOU TO NOD OFF OR FALL ASLEEP WHILE SITTING INACTIVE IN A PUBLIC PLACE: 0
ESS TOTAL SCORE: 0
HOW LIKELY ARE YOU TO NOD OFF OR FALL ASLEEP WHILE SITTING AND TALKING TO SOMEONE: 0
HOW LIKELY ARE YOU TO NOD OFF OR FALL ASLEEP WHILE LYING DOWN TO REST IN THE AFTERNOON WHEN CIRCUMSTANCES PERMIT: 0
HOW LIKELY ARE YOU TO NOD OFF OR FALL ASLEEP WHILE SITTING QUIETLY AFTER LUNCH WITHOUT ALCOHOL: 0
HOW LIKELY ARE YOU TO NOD OFF OR FALL ASLEEP IN A CAR, WHILE STOPPED FOR A FEW MINUTES IN TRAFFIC: 0
NECK CIRCUMFERENCE (INCHES): 14
HOW LIKELY ARE YOU TO NOD OFF OR FALL ASLEEP WHILE SITTING AND READING: 0
HOW LIKELY ARE YOU TO NOD OFF OR FALL ASLEEP WHEN YOU ARE A PASSENGER IN A CAR FOR AN HOUR WITHOUT A BREAK: 0

## 2023-06-01 NOTE — PROGRESS NOTES
discharge. Pharynx clear. Mallampati class IV. Neck: Trachea midline. No obvious mass. Neck circumference 13.5\"  Resp: No accessory muscle use. No crackles. No wheezes. No rhonchi. No dullness on percussion. Good air entry. CV: Regular rate. Regular rhythm. No murmur or rub. No edema. GI: Non-tender. Non-distended. No hernia. Skin: Warm and dry. No nodule on exposed extremities. Lymph: No cervical LAD. No supraclavicular LAD. M/S: No cyanosis. No joint deformity. No clubbing. Neuro: Awake. Alert. Moves all four extremities. Psych: Oriented x 3. No anxiety. DATA reviewed by me:   HST 12/18/2013 AHI 19. Desaturation to 82%  CPAP titration 12/21/21 BiPAP 13/9         CPAP data 02/14-04/12 2023 reviewed by me. Uses 8-9  hrs/night with 86% compliance and AHI of  12.5. CPAP 12 cmH2O       Assessment:       Moderate HOANG. Full face mask. CPAP 12 cmH2O. Optimal compliance with high residual AHI upon review today. Essential hypertension      Plan:    Change APAP 8-13 cmH2O- done by me today   Change to nasal pillow   Add heated tubing   Alternative treatment options for HOANG were discussed with patient including oral appliances, hypoglossal nerve stimulation and upper airways surgery. Will make a referral to Dr. Skinny Blakely for oral appliances evaluation if not better with the above changes   Order for CPAP supplies  Continue CPAP 6-8 hrs at night and during naps. Replacement of mask, tubing, head straps every 3-6 months or sooner if damaged. Follow up CPAP compliance and pressure adjustment if needed  Sleep hygiene  Avoid sedatives, alcohol and caffeinated drinks at bed time. No driving motorized vehicles or operating heavy machinery while fatigue, drowsy or sleepy. Weight loss is also recommended as a long-term intervention. Discussed with patient educational, supportive and behavioral interventions to improve CPAP compliance.   Continue blood pressure medications - treatment of HOANG can lower

## 2023-08-17 DIAGNOSIS — I10 ESSENTIAL HYPERTENSION: ICD-10-CM

## 2023-08-17 NOTE — TELEPHONE ENCOUNTER
.Refill Request     CONFIRM preferred pharmacy with the patient. If Mail Order Rx - Pend for 90 day refill. Last Seen: Last Seen Department: 2/28/2023  Last Seen by PCP: 2/28/2023    Last Written: 2-27-23 180 with 1     If no future appointment scheduled:  Review the last OV with PCP and review information for follow-up visit,  Route STAFF MESSAGE with patient name to the [facility] for scheduling with the following information:            -  Timing of next visit           -  Visit type ie Physical, OV, etc           -  Diagnoses/Reason ie. COPD, HTN - Do not use MEDICATION, Follow-up or CHECK UP - Give reason for visit      Next Appointment:   Future Appointments   Date Time Provider [address]   8/29/2023  1:30 PM DO RUBENS Arizmendi [place] - [facility]   5/28/2024  2:15 PM MD Andrei Belle       Message sent to [facility] to schedule appt with patient?   N/A      Requested Prescriptions     Pending Prescriptions Disp Refills    enalapril (VASOTEC) 20 MG tablet [Pharmacy Med Name: ENALAPRIL MALEATE 20 MG TAB] 180 tablet 1     Sig: TAKE ONE TABLET BY MOUTH TWICE A DAY    amLODIPine (NORVASC) 5 MG tablet [Pharmacy Med Name: amLODIPine BESYLATE 5 MG TAB] 180 tablet 1     Sig: TAKE ONE TABLET BY MOUTH EVERY MORNING AND TAKE ONE TABLET BY MOUTH EVERY NIGHT AT BEDTIME

## 2023-08-18 ENCOUNTER — TELEPHONE (OUTPATIENT)
Dept: FAMILY MEDICINE CLINIC | Age: 83
End: 2023-08-18

## 2023-08-18 DIAGNOSIS — K64.9 HEMORRHOIDS, UNSPECIFIED HEMORRHOID TYPE: ICD-10-CM

## 2023-08-18 RX ORDER — ENALAPRIL MALEATE 20 MG/1
TABLET ORAL
Qty: 180 TABLET | Refills: 1 | Status: SHIPPED | OUTPATIENT
Start: 2023-08-18

## 2023-08-18 RX ORDER — HYDROCORTISONE 10 MG/G
CREAM TOPICAL
Qty: 28 G | Refills: 1 | Status: SHIPPED | OUTPATIENT
Start: 2023-08-18

## 2023-08-18 RX ORDER — AMLODIPINE BESYLATE 5 MG/1
TABLET ORAL
Qty: 180 TABLET | Refills: 1 | Status: SHIPPED | OUTPATIENT
Start: 2023-08-18

## 2023-08-18 NOTE — TELEPHONE ENCOUNTER
Patient called stating that yesterday while she was pulling weeds, she came across a hornets nest and was subsequently stung numerous times. Patient denies itchy throat, difficulty breathing, hoarseness, numbness or tingling. Patient states that sites are itching, swollen and are painful. Patient is asking for something to help relieve the itching and swelling. Advised patient that she can use hydrocortisone cream OTC to help with the swelling. Patient stated that she has an order for hydrocortisone cream that she uses for hemorrhoids and asked if she could have that reordered.  Spoke to Dr. Mi Valdovinos about the request, and she re-ordered the cream.

## 2023-08-29 ENCOUNTER — OFFICE VISIT (OUTPATIENT)
Dept: FAMILY MEDICINE CLINIC | Age: 83
End: 2023-08-29
Payer: MEDICARE

## 2023-08-29 VITALS
SYSTOLIC BLOOD PRESSURE: 130 MMHG | DIASTOLIC BLOOD PRESSURE: 60 MMHG | BODY MASS INDEX: 23.37 KG/M2 | WEIGHT: 127 LBS | HEIGHT: 62 IN | HEART RATE: 62 BPM | OXYGEN SATURATION: 98 %

## 2023-08-29 DIAGNOSIS — R09.82 POST-NASAL DRIP: ICD-10-CM

## 2023-08-29 DIAGNOSIS — R15.1 FECAL SMEARING: Primary | ICD-10-CM

## 2023-08-29 DIAGNOSIS — K64.8 INTERNAL HEMORRHOID: ICD-10-CM

## 2023-08-29 PROCEDURE — 3078F DIAST BP <80 MM HG: CPT | Performed by: STUDENT IN AN ORGANIZED HEALTH CARE EDUCATION/TRAINING PROGRAM

## 2023-08-29 PROCEDURE — G8427 DOCREV CUR MEDS BY ELIG CLIN: HCPCS | Performed by: STUDENT IN AN ORGANIZED HEALTH CARE EDUCATION/TRAINING PROGRAM

## 2023-08-29 PROCEDURE — 1090F PRES/ABSN URINE INCON ASSESS: CPT | Performed by: STUDENT IN AN ORGANIZED HEALTH CARE EDUCATION/TRAINING PROGRAM

## 2023-08-29 PROCEDURE — 1036F TOBACCO NON-USER: CPT | Performed by: STUDENT IN AN ORGANIZED HEALTH CARE EDUCATION/TRAINING PROGRAM

## 2023-08-29 PROCEDURE — G8399 PT W/DXA RESULTS DOCUMENT: HCPCS | Performed by: STUDENT IN AN ORGANIZED HEALTH CARE EDUCATION/TRAINING PROGRAM

## 2023-08-29 PROCEDURE — 99213 OFFICE O/P EST LOW 20 MIN: CPT | Performed by: STUDENT IN AN ORGANIZED HEALTH CARE EDUCATION/TRAINING PROGRAM

## 2023-08-29 PROCEDURE — 1123F ACP DISCUSS/DSCN MKR DOCD: CPT | Performed by: STUDENT IN AN ORGANIZED HEALTH CARE EDUCATION/TRAINING PROGRAM

## 2023-08-29 PROCEDURE — 3074F SYST BP LT 130 MM HG: CPT | Performed by: STUDENT IN AN ORGANIZED HEALTH CARE EDUCATION/TRAINING PROGRAM

## 2023-08-29 PROCEDURE — G8420 CALC BMI NORM PARAMETERS: HCPCS | Performed by: STUDENT IN AN ORGANIZED HEALTH CARE EDUCATION/TRAINING PROGRAM

## 2023-08-29 NOTE — PROGRESS NOTES
Patient: Connor Nieto is a 82 y.o. female who presents today with the following Chief Complaint(s):  Chief Complaint   Patient presents with    6 Month Follow-Up    Tinnitus     At  night, from a medication     Hemorrhoids    Diarrhea         HPI    Feels that she is still having knee pain. Did have cortisone injections from Dr. Bernarda Booth. Does not feel this was helpful    Following with Dr. Milly Leiva  CKD has improved  On Sensipar    Reports that she is having increased fecal incontinence and stool smearing. Had colonoscopy in March with concern for large hemorrhoids. Current Outpatient Medications   Medication Sig Dispense Refill    enalapril (VASOTEC) 20 MG tablet TAKE ONE TABLET BY MOUTH TWICE A  tablet 1    amLODIPine (NORVASC) 5 MG tablet TAKE ONE TABLET BY MOUTH EVERY MORNING AND TAKE ONE TABLET BY MOUTH EVERY NIGHT AT BEDTIME 180 tablet 1    Hydrocortisone, Perianal, (PROCTO-FLEX) 1 % cream Apply topically 2 times daily. 28 g 1    diclofenac sodium (VOLTAREN) 1 % GEL Apply 4 g topically 4 times daily 100 g 0    Zinc 25 MG TABS Take by mouth      Glucosamine-Chondroit-Vit C-Mn (GLUCOSAMINE CHONDR 1500 COMPLX PO) Take by mouth      naproxen (NAPROSYN) 250 MG tablet Take 1 tablet by mouth 2 times daily (with meals)      SENSIPAR 30 MG tablet Take 1 tablet by mouth daily      Melatonin 5 MG TABS tablet Take  by mouth.      vitamin E 1000 UNITS capsule Take 4 capsules by mouth daily      Ascorbic Acid (VITAMIN C) 500 MG tablet Take 1 tablet by mouth daily      FISH OIL by Does not apply route       BL GLUCOSAMINE-CHONDROITIN PO Take  by mouth 2 times daily. simvastatin (ZOCOR) 20 MG tablet Take 1 tablet by mouth nightly       No current facility-administered medications for this visit.        Patient's past medical history,

## 2023-09-20 ENCOUNTER — OFFICE VISIT (OUTPATIENT)
Dept: SURGERY | Age: 83
End: 2023-09-20
Payer: MEDICARE

## 2023-09-20 VITALS
WEIGHT: 126 LBS | BODY MASS INDEX: 23.19 KG/M2 | OXYGEN SATURATION: 96 % | HEIGHT: 62 IN | SYSTOLIC BLOOD PRESSURE: 150 MMHG | RESPIRATION RATE: 16 BRPM | HEART RATE: 63 BPM | TEMPERATURE: 98.6 F | DIASTOLIC BLOOD PRESSURE: 78 MMHG

## 2023-09-20 DIAGNOSIS — K64.3 BLEEDING GRADE IV HEMORRHOIDS: Primary | ICD-10-CM

## 2023-09-20 PROCEDURE — G8427 DOCREV CUR MEDS BY ELIG CLIN: HCPCS | Performed by: SURGERY

## 2023-09-20 PROCEDURE — 3078F DIAST BP <80 MM HG: CPT | Performed by: SURGERY

## 2023-09-20 PROCEDURE — 99203 OFFICE O/P NEW LOW 30 MIN: CPT | Performed by: SURGERY

## 2023-09-20 PROCEDURE — 3077F SYST BP >= 140 MM HG: CPT | Performed by: SURGERY

## 2023-09-20 PROCEDURE — G8420 CALC BMI NORM PARAMETERS: HCPCS | Performed by: SURGERY

## 2023-09-20 PROCEDURE — 1090F PRES/ABSN URINE INCON ASSESS: CPT | Performed by: SURGERY

## 2023-09-20 NOTE — PROGRESS NOTES
Subjective:     Patient is a 82 y.o. woman with hemorrhoids    HPI: Ms. Camilla Herbert reports several weeks of large protruding hemorrhoids and fecal smearing. Had a colonoscopy March c Dr. Vianca Ni where hemorrhoids were seen. She wears a pad to protect her clothes. She has intermittent red blood with BM    Patient Active Problem List    Diagnosis Date Noted    HOANG (obstructive sleep apnea)     Neck pain 12/14/2021    Chronic RLQ pain 12/14/2021    Moderate obstructive sleep apnea     Witnessed episode of apnea     Obstructive sleep apnea syndrome 04/06/2021    Chronic renal failure, stage 3 (moderate) ([address]) 05/02/2017    Hyperparathyroidism, primary ([address]) 05/02/2017    Primary osteoarthritis of knees, bilateral 11/07/2016    Essential hypertension 10/19/2011    Pure hypercholesterolemia 10/19/2011     Past Medical History:   Diagnosis Date    Asthma     Diverticulosis     DJD (degenerative joint disease)     thoracic, lumbar    Hyperlipidemia     Hyperparathyroidism, unspecified ([address])     Hypertension     Osteopenia     stopped fosamax 11/2014 after being on for 10years    Pneumonia     Renal artery stenosis (HCC)     Sleep apnea     Spina bifida [facility])       Past Surgical History:   Procedure Laterality Date    ANGIOPLASTY      rt renal artery stenosis    BREAST REDUCTION SURGERY  1997    bilateral    COLONOSCOPY  11/18/2008    COLONOSCOPY  5/14/13    bx    DILATION AND CURETTAGE OF UTERUS        Not in a hospital admission.   Allergies   Allergen Reactions    Adhesive Tape Rash      Social History     Tobacco Use    Smoking status: Never    Smokeless tobacco: Never   Substance Use Topics    Alcohol use: No      Family History   Problem Relation Age of Onset    Cancer Mother 80        duodenal    Heart Disease Father 78    Hypertension Father     Hypertension Brother     Diabetes Brother     Cancer Brother         type 2    Hypertension Brother     Diabetes Brother     Hypertension Brother     Heart Disease Other

## 2023-09-26 ENCOUNTER — TELEPHONE (OUTPATIENT)
Dept: FAMILY MEDICINE CLINIC | Age: 83
End: 2023-09-26

## 2023-10-09 LAB
ALBUMIN SERPL-MCNC: 4.4 G/DL
BASOPHILS ABSOLUTE: 0 /ΜL
BASOPHILS RELATIVE PERCENT: 1 %
BILIRUBIN, URINE: NEGATIVE
BLOOD, URINE: NEGATIVE
BUN / CREAT RATIO: 23
BUN BLDV-MCNC: 21 MG/DL
CALCIUM SERPL-MCNC: 9.3 MG/DL
CHLORIDE BLD-SCNC: 102 MMOL/L
CHOLESTEROL, TOTAL: 156 MG/DL
CHOLESTEROL/HDL RATIO: NORMAL
CLARITY: CLEAR
CO2: 25 MMOL/L
COLOR: YELLOW
CREAT SERPL-MCNC: 0.93 MG/DL
EOSINOPHILS ABSOLUTE: 0.2 /ΜL
EOSINOPHILS RELATIVE PERCENT: 3 %
GFR SERPL CREATININE-BSD FRML MDRD: 62 ML/MIN/{1.73_M2}
GLUCOSE URINE: NEGATIVE
GLUCOSE: 84
HCT VFR BLD CALC: 42.3 % (ref 36–46)
HDLC SERPL-MCNC: 58 MG/DL (ref 35–70)
HEMOGLOBIN: 14 G/DL (ref 12–16)
KETONES, URINE: NEGATIVE
LDL CHOLESTEROL CALCULATED: 84 MG/DL (ref 0–160)
LEUKOCYTE ESTERASE, URINE: NORMAL
LYMPHOCYTES ABSOLUTE: 0.8 /ΜL
LYMPHOCYTES RELATIVE PERCENT: 15 %
MCH RBC QN AUTO: 32.1 PG
MCHC RBC AUTO-ENTMCNC: 33.1 G/DL
MCV RBC AUTO: 97 FL
MONOCYTES ABSOLUTE: 0.4 /ΜL
MONOCYTES RELATIVE PERCENT: 8 %
NEUTROPHILS ABSOLUTE: 3.8 /ΜL
NEUTROPHILS RELATIVE PERCENT: 72 %
NITRITE, URINE: POSITIVE
NONHDLC SERPL-MCNC: NORMAL MG/DL
PH UA: 7 (ref 4.5–8)
PHOSPHORUS: 4 MG/DL
PLATELET # BLD: 201 K/ΜL
PMV BLD AUTO: NORMAL FL
POTASSIUM SERPL-SCNC: 4.1 MMOL/L
PROTEIN UA: NEGATIVE
RBC # BLD: 4.36 10^6/ΜL
SODIUM BLD-SCNC: 140 MMOL/L
SPECIFIC GRAVITY, URINE: 1.01
TRIGL SERPL-MCNC: 69 MG/DL
UROBILINOGEN, URINE: NORMAL
VLDLC SERPL CALC-MCNC: 14 MG/DL
WBC # BLD: 5.3 10^3/ML

## 2024-01-11 ENCOUNTER — TELEMEDICINE (OUTPATIENT)
Dept: FAMILY MEDICINE CLINIC | Age: 84
End: 2024-01-11
Payer: MEDICARE

## 2024-01-11 DIAGNOSIS — Z00.00 MEDICARE ANNUAL WELLNESS VISIT, SUBSEQUENT: Primary | ICD-10-CM

## 2024-01-11 PROCEDURE — 1123F ACP DISCUSS/DSCN MKR DOCD: CPT | Performed by: STUDENT IN AN ORGANIZED HEALTH CARE EDUCATION/TRAINING PROGRAM

## 2024-01-11 PROCEDURE — G0439 PPPS, SUBSEQ VISIT: HCPCS | Performed by: STUDENT IN AN ORGANIZED HEALTH CARE EDUCATION/TRAINING PROGRAM

## 2024-01-11 ASSESSMENT — LIFESTYLE VARIABLES
HOW MANY STANDARD DRINKS CONTAINING ALCOHOL DO YOU HAVE ON A TYPICAL DAY: PATIENT DOES NOT DRINK
HOW OFTEN DO YOU HAVE A DRINK CONTAINING ALCOHOL: NEVER

## 2024-01-11 NOTE — PROGRESS NOTES
Medicare Annual Wellness Visit    January Colunga is here for Medicare AWV    Assessment & Plan   Medicare annual wellness visit, subsequent  Recommendations for Preventive Services Due: see orders and patient instructions/AVS.  Recommended screening schedule for the next 5-10 years is provided to the patient in written form: see Patient Instructions/AVS.     No follow-ups on file.     Subjective       Patient's complete Health Risk Assessment and screening values have been reviewed and are found in Flowsheets. The following problems were reviewed today and where indicated follow up appointments were made and/or referrals ordered.    Positive Risk Factor Screenings with Interventions:               General HRA Questions:  Select all that apply: (!) New or Increased Pain, Loneliness (some knee pain after dogs  knocked her over, spouse recently passed away)    Pain Interventions:  See AVS for additional education material    Loneliness Interventions:  See AVS for additional education material       Dentist Screen:  Have you seen the dentist within the past year?: (!) No    Intervention:  Advised to schedule with their dentist     Vision Screen:  Do you have difficulty driving, watching TV, or doing any of your daily activities because of your eyesight?: (!) Yes (R eye blurry- dry eyes)  Have you had an eye exam within the past year?: Yes  No results found.    Interventions:   Patient encouraged to make appointment with their eye specialist    Safety:  Do you have any tripping hazards - loose or unsecured carpets or rugs?: (!) Yes  Interventions:  See AVS for additional education material    ADL's:   Patient reports needing help with:  Select all that apply: (!) Housekeeping  Interventions:  See AVS for additional education material                  Objective      Patient-Reported Vitals  Patient-Reported Systolic (Top): 120 mmHg  Patient-Reported Diastolic (Bottom): 62 mmHg  Patient-Reported Weight:

## 2024-01-11 NOTE — PATIENT INSTRUCTIONS
for information about dental care in your area.  Using a toothbrush  Older adults with arthritis sometimes have trouble brushing their teeth because they can't easily hold the toothbrush. Their hands and fingers may be stiff, painful, or weak. If this is the case, you can:  Offer an electric toothbrush.  Enlarge the handle of a non-electric toothbrush by wrapping a sponge, an elastic bandage, or adhesive tape around it.  Push the toothbrush handle through a ball made of rubber or soft foam.  Make the handle longer and thicker by taping Popsicle sticks or tongue depressors to it.  You may also be able to buy special toothbrushes, toothpaste dispensers, and floss holders.  Your doctor may recommend a soft-bristle toothbrush if the person you care for bleeds easily. Bleeding can happen because of a health problem or from certain medicines.  A toothpaste for sensitive teeth may help if the person you care for has sensitive teeth.  How do you brush and floss someone's teeth?  If the person you are caring for has a hard time cleaning their teeth on their own, you may need to brush and floss their teeth for them. It may be easiest to have the person sit and face away from you, and to sit or stand behind them. That way you can steady their head against your arm as you reach around to floss and brush their teeth. Choose a place that has good lighting and is comfortable for both of you.  Before you begin, gather your supplies. You will need gloves, floss, a toothbrush, and a container to hold water if you are not near a sink. Wash and dry your hands well and put on gloves. Start by flossing:  Gently work a piece of floss between each of the teeth toward the gums. A plastic flossing tool may make this easier, and they are available at [facility].  Curve the floss around each tooth into a U-shape and gently slide it under the gum line.  Move the floss firmly up and down several times to scrape off the plaque.  After you've

## 2024-02-06 ENCOUNTER — TELEPHONE (OUTPATIENT)
Dept: FAMILY MEDICINE CLINIC | Age: 84
End: 2024-02-06

## 2024-02-06 NOTE — TELEPHONE ENCOUNTER
Received phone call from the pt in regards to she has been having trouble with thinking and concentrating. She bought OTC medications called [id number] as to which she will take 2x per day. She stated that it tells her to contact her PCP before taking this medication with her having kidney failure. Please advise if this medication is safe and ok for her to taker.

## 2024-02-13 DIAGNOSIS — I10 ESSENTIAL HYPERTENSION: ICD-10-CM

## 2024-02-13 RX ORDER — ENALAPRIL MALEATE 20 MG/1
20 TABLET ORAL 2 TIMES DAILY
Qty: 180 TABLET | Refills: 1 | Status: SHIPPED | OUTPATIENT
Start: 2024-02-13

## 2024-02-13 RX ORDER — AMLODIPINE BESYLATE 5 MG/1
TABLET ORAL
Qty: 180 TABLET | Refills: 1 | Status: SHIPPED | OUTPATIENT
Start: 2024-02-13

## 2024-02-13 RX ORDER — ATENOLOL 50 MG/1
TABLET ORAL
Qty: 90 TABLET | Refills: 3 | Status: SHIPPED | OUTPATIENT
Start: 2024-02-13

## 2024-02-13 NOTE — TELEPHONE ENCOUNTER
Refill Request     CONFIRM preferred pharmacy with the patient.    If Mail Order Rx - Pend for 90 day refill.      Last Seen: Last Seen Department: 2024  Last Seen by PCP: 2024    Last Written: 23 180 tabs 1 refill     If no future appointment scheduled:  Review the last OV with PCP and review information for follow-up visit,  Route STAFF MESSAGE with patient name to the  Pool for scheduling with the following information:            -  Timing of next visit           -  Visit type ie Physical, OV, etc           -  Diagnoses/Reason ie. COPD, HTN - Do not use MEDICATION, Follow-up or CHECK UP - Give reason for visit      Next Appointment:   Future Appointments   Date Time Provider Department Center   3/5/2024  2:00 PM Wilbert Giles DO [facility]  [place] - [facility]   2024  2:15 PM Tr Valenzuela MD [facility]       Message sent to  to schedule appt with patient?  NO      Requested Prescriptions     Pending Prescriptions Disp Refills    amLODIPine (NORVASC) 5 MG tablet 180 tablet 1     Sig: TAKE ONE TABLET BY MOUTH EVERY MORNING AND TAKE ONE TABLET BY MOUTH EVERY NIGHT AT BEDTIME    enalapril (VASOTEC) 20 MG tablet 180 tablet 1     Sig: Take 1 tablet by mouth 2 times daily    atenolol (TENORMIN) 50 MG tablet 90 tablet 3     Si/2 tab twice daily

## 2024-03-05 ENCOUNTER — OFFICE VISIT (OUTPATIENT)
Dept: FAMILY MEDICINE CLINIC | Age: 84
End: 2024-03-05
Payer: MEDICARE

## 2024-03-05 VITALS
BODY MASS INDEX: 23.05 KG/M2 | WEIGHT: 126 LBS | SYSTOLIC BLOOD PRESSURE: 130 MMHG | OXYGEN SATURATION: 97 % | HEART RATE: 66 BPM | DIASTOLIC BLOOD PRESSURE: 70 MMHG

## 2024-03-05 DIAGNOSIS — N18.31 CHRONIC RENAL FAILURE, STAGE 3A (HCC): ICD-10-CM

## 2024-03-05 DIAGNOSIS — G47.33 MODERATE OBSTRUCTIVE SLEEP APNEA: ICD-10-CM

## 2024-03-05 DIAGNOSIS — I10 ESSENTIAL HYPERTENSION: ICD-10-CM

## 2024-03-05 DIAGNOSIS — E21.0 HYPERPARATHYROIDISM, PRIMARY (HCC): Primary | ICD-10-CM

## 2024-03-05 DIAGNOSIS — F43.21 GRIEF: ICD-10-CM

## 2024-03-05 PROCEDURE — G8427 DOCREV CUR MEDS BY ELIG CLIN: HCPCS | Performed by: STUDENT IN AN ORGANIZED HEALTH CARE EDUCATION/TRAINING PROGRAM

## 2024-03-05 PROCEDURE — G8399 PT W/DXA RESULTS DOCUMENT: HCPCS | Performed by: STUDENT IN AN ORGANIZED HEALTH CARE EDUCATION/TRAINING PROGRAM

## 2024-03-05 PROCEDURE — 1090F PRES/ABSN URINE INCON ASSESS: CPT | Performed by: STUDENT IN AN ORGANIZED HEALTH CARE EDUCATION/TRAINING PROGRAM

## 2024-03-05 PROCEDURE — 3075F SYST BP GE 130 - 139MM HG: CPT | Performed by: STUDENT IN AN ORGANIZED HEALTH CARE EDUCATION/TRAINING PROGRAM

## 2024-03-05 PROCEDURE — G8484 FLU IMMUNIZE NO ADMIN: HCPCS | Performed by: STUDENT IN AN ORGANIZED HEALTH CARE EDUCATION/TRAINING PROGRAM

## 2024-03-05 PROCEDURE — 99213 OFFICE O/P EST LOW 20 MIN: CPT | Performed by: STUDENT IN AN ORGANIZED HEALTH CARE EDUCATION/TRAINING PROGRAM

## 2024-03-05 PROCEDURE — G8420 CALC BMI NORM PARAMETERS: HCPCS | Performed by: STUDENT IN AN ORGANIZED HEALTH CARE EDUCATION/TRAINING PROGRAM

## 2024-03-05 PROCEDURE — 1123F ACP DISCUSS/DSCN MKR DOCD: CPT | Performed by: STUDENT IN AN ORGANIZED HEALTH CARE EDUCATION/TRAINING PROGRAM

## 2024-03-05 PROCEDURE — 3078F DIAST BP <80 MM HG: CPT | Performed by: STUDENT IN AN ORGANIZED HEALTH CARE EDUCATION/TRAINING PROGRAM

## 2024-03-05 PROCEDURE — 1036F TOBACCO NON-USER: CPT | Performed by: STUDENT IN AN ORGANIZED HEALTH CARE EDUCATION/TRAINING PROGRAM

## 2024-03-05 SDOH — ECONOMIC STABILITY: INCOME INSECURITY: HOW HARD IS IT FOR YOU TO PAY FOR THE VERY BASICS LIKE FOOD, HOUSING, MEDICAL CARE, AND HEATING?: NOT HARD AT ALL

## 2024-03-05 SDOH — ECONOMIC STABILITY: FOOD INSECURITY: WITHIN THE PAST 12 MONTHS, YOU WORRIED THAT YOUR FOOD WOULD RUN OUT BEFORE YOU GOT MONEY TO BUY MORE.: NEVER TRUE

## 2024-03-05 SDOH — ECONOMIC STABILITY: FOOD INSECURITY: WITHIN THE PAST 12 MONTHS, THE FOOD YOU BOUGHT JUST DIDN'T LAST AND YOU DIDN'T HAVE MONEY TO GET MORE.: NEVER TRUE

## 2024-03-05 SDOH — ECONOMIC STABILITY: HOUSING INSECURITY
IN THE LAST 12 MONTHS, WAS THERE A TIME WHEN YOU DID NOT HAVE A STEADY PLACE TO SLEEP OR SLEPT IN A SHELTER (INCLUDING NOW)?: NO

## 2024-03-05 NOTE — PROGRESS NOTES
Assessment:  Encounter Diagnoses   Name Primary?    Hyperparathyroidism, primary (HCC) Yes    Chronic renal failure, stage 3a (HCC)     Moderate obstructive sleep apnea     Essential hypertension     Grief        Plan:  1. Hyperparathyroidism, primary (HCC)  2. Chronic renal failure, stage 3a (HCC)  3. Moderate obstructive sleep apnea  4. Essential hypertension  5. Grief   Reviewed recent lab work from nephrology which shows improving renal function and GFR 63. PTH normal. Has been able to decrease sensipar to twice weekly. Continue follow up with nephrology. BP controlled on current medication. Discussed grief and medication/counseling/mindfullness interventions. Does not wish for medication at this time and feels she can manage grief appropriately on her own. Will follow up if worsening.       No follow-ups on file.      Patient: January Colunga is a 83 y.o. female who presents today with the following Chief Complaint(s):  Chief Complaint   Patient presents with    6 Month Follow-Up         HPI    CKD3/hypercalcemia due to elevated PTH  Following with Dr. Pena  On Sensipar twice weekly    Grade IV hemmorhoids  Evaluated by Dr. Kiser and discussed possible hemmorhoidectomy given fecal smearing and size  Patient chose not to pursue surgery    HOANG  Now using CPAP, following with     HTN  amlodipine 5mg BID  Enalapril 20mg BID  Atenolol 50mg BID    HLD  Simvistatin    Has noticed increased grief and feeling of isolation over lasts several months   passed 15 months ago  Now working on clearing out clothing and other items from home    Current Outpatient Medications   Medication Sig Dispense Refill    Boswellia-Glucosamine-Vit D (OSTEO BI-FLEX-GLUCOS/5-LOXIN PO) Take by mouth      amLODIPine (NORVASC) 5 MG tablet TAKE ONE TABLET BY MOUTH EVERY MORNING AND TAKE ONE TABLET BY MOUTH EVERY NIGHT AT BEDTIME 180 tablet 1    enalapril (VASOTEC) 20 MG tablet Take 1 tablet by mouth 2 times daily 180

## 2024-03-15 LAB
CHOLESTEROL, TOTAL: 133 MG/DL
CHOLESTEROL/HDL RATIO: NORMAL
HDLC SERPL-MCNC: 42 MG/DL (ref 35–70)
LDL CHOLESTEROL CALCULATED: 75 MG/DL (ref 0–160)
NONHDLC SERPL-MCNC: NORMAL MG/DL
TRIGL SERPL-MCNC: 81 MG/DL
VLDLC SERPL CALC-MCNC: 16 MG/DL

## 2024-05-28 ENCOUNTER — OFFICE VISIT (OUTPATIENT)
Dept: PULMONOLOGY | Age: 84
End: 2024-05-28
Payer: MEDICARE

## 2024-05-28 VITALS
OXYGEN SATURATION: 99 % | SYSTOLIC BLOOD PRESSURE: 130 MMHG | DIASTOLIC BLOOD PRESSURE: 80 MMHG | HEART RATE: 68 BPM | HEIGHT: 63 IN | WEIGHT: 127 LBS | BODY MASS INDEX: 22.5 KG/M2

## 2024-05-28 DIAGNOSIS — G47.33 OSA (OBSTRUCTIVE SLEEP APNEA): Primary | ICD-10-CM

## 2024-05-28 DIAGNOSIS — I10 HYPERTENSION, UNSPECIFIED TYPE: ICD-10-CM

## 2024-05-28 PROCEDURE — G8427 DOCREV CUR MEDS BY ELIG CLIN: HCPCS | Performed by: INTERNAL MEDICINE

## 2024-05-28 PROCEDURE — 1123F ACP DISCUSS/DSCN MKR DOCD: CPT | Performed by: INTERNAL MEDICINE

## 2024-05-28 PROCEDURE — 3075F SYST BP GE 130 - 139MM HG: CPT | Performed by: INTERNAL MEDICINE

## 2024-05-28 PROCEDURE — 1036F TOBACCO NON-USER: CPT | Performed by: INTERNAL MEDICINE

## 2024-05-28 PROCEDURE — 1090F PRES/ABSN URINE INCON ASSESS: CPT | Performed by: INTERNAL MEDICINE

## 2024-05-28 PROCEDURE — G8399 PT W/DXA RESULTS DOCUMENT: HCPCS | Performed by: INTERNAL MEDICINE

## 2024-05-28 PROCEDURE — 3079F DIAST BP 80-89 MM HG: CPT | Performed by: INTERNAL MEDICINE

## 2024-05-28 PROCEDURE — 99214 OFFICE O/P EST MOD 30 MIN: CPT | Performed by: INTERNAL MEDICINE

## 2024-05-28 PROCEDURE — G8420 CALC BMI NORM PARAMETERS: HCPCS | Performed by: INTERNAL MEDICINE

## 2024-05-28 ASSESSMENT — SLEEP AND FATIGUE QUESTIONNAIRES
HOW LIKELY ARE YOU TO NOD OFF OR FALL ASLEEP WHILE SITTING AND READING: WOULD NEVER DOZE
HOW LIKELY ARE YOU TO NOD OFF OR FALL ASLEEP WHILE SITTING INACTIVE IN A PUBLIC PLACE: WOULD NEVER DOZE
HOW LIKELY ARE YOU TO NOD OFF OR FALL ASLEEP IN A CAR, WHILE STOPPED FOR A FEW MINUTES IN TRAFFIC: WOULD NEVER DOZE
HOW LIKELY ARE YOU TO NOD OFF OR FALL ASLEEP WHILE SITTING QUIETLY AFTER LUNCH WITHOUT ALCOHOL: WOULD NEVER DOZE
ESS TOTAL SCORE: 0
HOW LIKELY ARE YOU TO NOD OFF OR FALL ASLEEP WHILE SITTING AND TALKING TO SOMEONE: WOULD NEVER DOZE
HOW LIKELY ARE YOU TO NOD OFF OR FALL ASLEEP WHEN YOU ARE A PASSENGER IN A CAR FOR AN HOUR WITHOUT A BREAK: WOULD NEVER DOZE
NECK CIRCUMFERENCE (INCHES): 13
HOW LIKELY ARE YOU TO NOD OFF OR FALL ASLEEP WHILE LYING DOWN TO REST IN THE AFTERNOON WHEN CIRCUMSTANCES PERMIT: WOULD NEVER DOZE
HOW LIKELY ARE YOU TO NOD OFF OR FALL ASLEEP WHILE WATCHING TV: WOULD NEVER DOZE

## 2024-05-28 NOTE — PROGRESS NOTES
[facility]                                                                  CHIEF COMPLAINT: follow up HOANG        HPI:   Patient is using CPAP 7-9  hrs/night. Using humidifier. Uses full face mask- ghotra snot like. No nodding off when driving. Bedtime is 11 pm and rise time is 8 am. Sleep onset is 5 minutes. ESS is 2         From prior visit:   Patient was diagnosed with moderate HOANG 12/18/13. Associated with snoring, witnessed apnea, and hypersomnia. Did not want to get CPAP at that time. Better with her chin strap and when sitting up - 75 degrees due to her back pain. + dry mouth upon awakening.  Feels overall better and wondering if she still has it.  Interesting in knowing whether she has HOANG or not, if she has might consider CPAP therapy. Bedtime 11-11:30 pm and rise time is 7-7:30 am. Sleep onset 20 minutes.  No naps. Old records were reviewed and summarized by me.       Past Medical History:   Diagnosis Date    Asthma     Diverticulosis     DJD (degenerative joint disease)     thoracic, lumbar    Hyperlipidemia     Hyperparathyroidism, unspecified (HCC)     Hypertension     Osteopenia     stopped fosamax 11/2014 after being on for 10years    Pneumonia     Renal artery stenosis (HCC)     Sleep apnea     Spina bifida (HCC)        Past Surgical History:        Procedure Laterality Date    ANGIOPLASTY      rt renal artery stenosis    BREAST REDUCTION SURGERY  1997    bilateral    COLONOSCOPY  11/18/2008    COLONOSCOPY  5/14/13    bx    DILATION AND CURETTAGE OF UTERUS         Allergies:  is allergic to adhesive tape.  Social History:    TOBACCO:   reports that she has never smoked. She has never used smokeless tobacco.  ETOH:   reports no history of alcohol use.      Family History:       Problem Relation Age of Onset    Cancer Mother 81        duodenal    Heart Disease Father 79    Hypertension Father     Hypertension Brother     Diabetes

## 2024-05-28 NOTE — PATIENT INSTRUCTIONS
List of Medical Supply Companies / \"DME\" companies:  SUPPLIER TELEPHONE FAX           ADDRESS   [facility]  [phone] [address] [place] [address]   [facility]       ([facility]) [phone] [address] [place] [address]   [facility] [phone] [address] [place] [address]   [facility] [phone] [address] [place] [address]  **[facility]**   [facility] [phone]  [phone] [address] [place] [address]   [facility] [phone] or  [phone] [address] [place] [address]  **[facility]**   [facility] [phone] [address] [place] [address]   [employer] [facility] [phone] [phone] Fax Order to them and they will forward to DME   [facility] [phone] [address] [place] [address]  **[facility] [address]/[facility] [phone] or  [phone] [facility] [place] [address]  **[facility]   [email or web address] [facility]  Supplies, not DME  No insurance, Cash ONLY [phone]  [email or web address] [phone]    [email or web address] (online) [phone] Online   [facility] [phone]  [phone] [address] [place] [address] [place] [address]   [facility]  Oxygen/PAP supplies only [phone] [address] [place] [address]  **[facility]  Oxygen/[phone] [address] [place] [address]   [facility]  Oxygen/[phone] [address] [place] [address]   [facility]  Oxygen/[phone] [address] [address]   [facility]  Oxygen/[phone]

## 2024-06-05 ENCOUNTER — TELEPHONE (OUTPATIENT)
Dept: PULMONOLOGY | Age: 84
End: 2024-06-05

## 2024-06-05 DIAGNOSIS — G47.33 OSA (OBSTRUCTIVE SLEEP APNEA): Primary | ICD-10-CM

## 2024-06-07 NOTE — TELEPHONE ENCOUNTER
Change APAP 6-14  Okay for mask fitting  
Faxed nasal pillows mask order, pressure change order, chin strap order, mask fitting order, and heated tubing order to [facility] at [phone] via [employer].  
Please sign orders   
Pt is aware of changes.    
Pt stopped in and stated that she is having a lot of gas due to the pressure of her cpap machine.  Pt also has visible reaction to the mask on her cheeks looks red and inflamed on her cheek bones.  I think Pt needs to be fitted for a new mask and maybe a different style.  Called tina left a message for them to call me to speak about Pt.  Please advise if we can change pressures for Pt.    
normal...

## 2024-08-09 DIAGNOSIS — I10 ESSENTIAL HYPERTENSION: ICD-10-CM

## 2024-08-09 RX ORDER — AMLODIPINE BESYLATE 5 MG/1
TABLET ORAL
Qty: 180 TABLET | Refills: 1 | Status: SHIPPED | OUTPATIENT
Start: 2024-08-09

## 2024-08-09 RX ORDER — ENALAPRIL MALEATE 20 MG/1
20 TABLET ORAL 2 TIMES DAILY
Qty: 180 TABLET | Refills: 1 | Status: SHIPPED | OUTPATIENT
Start: 2024-08-09

## 2024-08-09 NOTE — TELEPHONE ENCOUNTER
Refill Request     CONFIRM preferred pharmacy with the patient.    If Mail Order Rx - Pend for 90 day refill.      Last Seen: Last Seen Department: 3/5/2024  Last Seen by PCP: 3/5/2024    Last Written: amlodipine 2/13/24 180 with 1 refill     Enalapril 2/13/24 180 with 1 refill     If no future appointment scheduled:  Review the last OV with PCP and review information for follow-up visit,  Route STAFF MESSAGE with patient name to the  Pool for scheduling with the following information:            -  Timing of next visit           -  Visit type ie Physical, OV, etc           -  Diagnoses/Reason ie. COPD, HTN - Do not use MEDICATION, Follow-up or CHECK UP - Give reason for visit      Next Appointment:   Future Appointments   Date Time Provider Department Center   9/5/2024  2:00 PM Wilbert Giles DO [facility]   11/14/2024  2:00 PM Tr Valenzuela MD [facility]       Message sent to  to schedule appt with patient?  NO      Requested Prescriptions     Pending Prescriptions Disp Refills    amLODIPine (NORVASC) 5 MG tablet [Pharmacy Med Name: AMLODIPINE BESYLATE 5 MG TAB] 180 tablet 1     Sig: TAKE 1 TABLET BY MOUTH EVERY MORNING AND TAKE 1 TABLET BY MOUTH EVERY NIGHT AT BEDTIME    enalapril (VASOTEC) 20 MG tablet [Pharmacy Med Name: ENALAPRIL MALEATE 20 MG TAB] 180 tablet 1     Sig: TAKE 1 TABLET BY MOUTH TWICE A DAY

## 2024-08-30 ENCOUNTER — TELEPHONE (OUTPATIENT)
Dept: PULMONOLOGY | Age: 84
End: 2024-08-30

## 2024-08-30 NOTE — TELEPHONE ENCOUNTER
Tried to  call Pt to get her r/s Dr. Valenzuela will not be here 11/14  Left message for Pt to return my phone call to the office.

## 2024-09-05 ENCOUNTER — OFFICE VISIT (OUTPATIENT)
Dept: FAMILY MEDICINE CLINIC | Age: 84
End: 2024-09-05
Payer: MEDICARE

## 2024-09-05 VITALS
OXYGEN SATURATION: 93 % | WEIGHT: 127.2 LBS | HEIGHT: 63 IN | SYSTOLIC BLOOD PRESSURE: 128 MMHG | TEMPERATURE: 96.9 F | BODY MASS INDEX: 22.54 KG/M2 | DIASTOLIC BLOOD PRESSURE: 62 MMHG | HEART RATE: 63 BPM

## 2024-09-05 DIAGNOSIS — E78.00 PURE HYPERCHOLESTEROLEMIA: ICD-10-CM

## 2024-09-05 DIAGNOSIS — I10 ESSENTIAL HYPERTENSION: ICD-10-CM

## 2024-09-05 DIAGNOSIS — G47.33 MODERATE OBSTRUCTIVE SLEEP APNEA: ICD-10-CM

## 2024-09-05 DIAGNOSIS — G47.00 INSOMNIA, UNSPECIFIED TYPE: ICD-10-CM

## 2024-09-05 DIAGNOSIS — N18.31 CHRONIC RENAL FAILURE, STAGE 3A (HCC): ICD-10-CM

## 2024-09-05 DIAGNOSIS — R09.82 POST-NASAL DRAINAGE: ICD-10-CM

## 2024-09-05 DIAGNOSIS — M54.2 CERVICALGIA: Primary | ICD-10-CM

## 2024-09-05 PROCEDURE — G8399 PT W/DXA RESULTS DOCUMENT: HCPCS | Performed by: STUDENT IN AN ORGANIZED HEALTH CARE EDUCATION/TRAINING PROGRAM

## 2024-09-05 PROCEDURE — 1090F PRES/ABSN URINE INCON ASSESS: CPT | Performed by: STUDENT IN AN ORGANIZED HEALTH CARE EDUCATION/TRAINING PROGRAM

## 2024-09-05 PROCEDURE — 1123F ACP DISCUSS/DSCN MKR DOCD: CPT | Performed by: STUDENT IN AN ORGANIZED HEALTH CARE EDUCATION/TRAINING PROGRAM

## 2024-09-05 PROCEDURE — 3074F SYST BP LT 130 MM HG: CPT | Performed by: STUDENT IN AN ORGANIZED HEALTH CARE EDUCATION/TRAINING PROGRAM

## 2024-09-05 PROCEDURE — 99214 OFFICE O/P EST MOD 30 MIN: CPT | Performed by: STUDENT IN AN ORGANIZED HEALTH CARE EDUCATION/TRAINING PROGRAM

## 2024-09-05 PROCEDURE — 1036F TOBACCO NON-USER: CPT | Performed by: STUDENT IN AN ORGANIZED HEALTH CARE EDUCATION/TRAINING PROGRAM

## 2024-09-05 PROCEDURE — G8420 CALC BMI NORM PARAMETERS: HCPCS | Performed by: STUDENT IN AN ORGANIZED HEALTH CARE EDUCATION/TRAINING PROGRAM

## 2024-09-05 PROCEDURE — 3078F DIAST BP <80 MM HG: CPT | Performed by: STUDENT IN AN ORGANIZED HEALTH CARE EDUCATION/TRAINING PROGRAM

## 2024-09-05 PROCEDURE — G8427 DOCREV CUR MEDS BY ELIG CLIN: HCPCS | Performed by: STUDENT IN AN ORGANIZED HEALTH CARE EDUCATION/TRAINING PROGRAM

## 2024-09-05 RX ORDER — FLUTICASONE PROPIONATE 50 MCG
1 SPRAY, SUSPENSION (ML) NASAL DAILY
Qty: 16 G | Refills: 0 | Status: SHIPPED | OUTPATIENT
Start: 2024-09-05

## 2024-09-05 RX ORDER — SIMVASTATIN 20 MG
20 TABLET ORAL EVERY OTHER DAY
Qty: 90 TABLET | Refills: 1 | Status: SHIPPED | OUTPATIENT
Start: 2024-09-05

## 2024-09-05 RX ORDER — TRAZODONE HYDROCHLORIDE 50 MG/1
50 TABLET, FILM COATED ORAL NIGHTLY
Qty: 30 TABLET | Refills: 0 | Status: SHIPPED | OUTPATIENT
Start: 2024-09-05

## 2024-09-05 ASSESSMENT — PATIENT HEALTH QUESTIONNAIRE - PHQ9
9. THOUGHTS THAT YOU WOULD BE BETTER OFF DEAD, OR OF HURTING YOURSELF: SEVERAL DAYS
10. IF YOU CHECKED OFF ANY PROBLEMS, HOW DIFFICULT HAVE THESE PROBLEMS MADE IT FOR YOU TO DO YOUR WORK, TAKE CARE OF THINGS AT HOME, OR GET ALONG WITH OTHER PEOPLE: NOT DIFFICULT AT ALL
1. LITTLE INTEREST OR PLEASURE IN DOING THINGS: SEVERAL DAYS
6. FEELING BAD ABOUT YOURSELF - OR THAT YOU ARE A FAILURE OR HAVE LET YOURSELF OR YOUR FAMILY DOWN: SEVERAL DAYS
SUM OF ALL RESPONSES TO PHQ QUESTIONS 1-9: 5
SUM OF ALL RESPONSES TO PHQ QUESTIONS 1-9: 5
SUM OF ALL RESPONSES TO PHQ QUESTIONS 1-9: 4
4. FEELING TIRED OR HAVING LITTLE ENERGY: NOT AT ALL
7. TROUBLE CONCENTRATING ON THINGS, SUCH AS READING THE NEWSPAPER OR WATCHING TELEVISION: NOT AT ALL
SUM OF ALL RESPONSES TO PHQ9 QUESTIONS 1 & 2: 2
5. POOR APPETITE OR OVEREATING: NOT AT ALL
3. TROUBLE FALLING OR STAYING ASLEEP: SEVERAL DAYS
SUM OF ALL RESPONSES TO PHQ QUESTIONS 1-9: 5
2. FEELING DOWN, DEPRESSED OR HOPELESS: SEVERAL DAYS
8. MOVING OR SPEAKING SO SLOWLY THAT OTHER PEOPLE COULD HAVE NOTICED. OR THE OPPOSITE, BEING SO FIGETY OR RESTLESS THAT YOU HAVE BEEN MOVING AROUND A LOT MORE THAN USUAL: NOT AT ALL

## 2024-09-05 ASSESSMENT — ANXIETY QUESTIONNAIRES
6. BECOMING EASILY ANNOYED OR IRRITABLE: NOT AT ALL
3. WORRYING TOO MUCH ABOUT DIFFERENT THINGS: SEVERAL DAYS
GAD7 TOTAL SCORE: 3
7. FEELING AFRAID AS IF SOMETHING AWFUL MIGHT HAPPEN: SEVERAL DAYS
4. TROUBLE RELAXING: NOT AT ALL
IF YOU CHECKED OFF ANY PROBLEMS ON THIS QUESTIONNAIRE, HOW DIFFICULT HAVE THESE PROBLEMS MADE IT FOR YOU TO DO YOUR WORK, TAKE CARE OF THINGS AT HOME, OR GET ALONG WITH OTHER PEOPLE: SOMEWHAT DIFFICULT
2. NOT BEING ABLE TO STOP OR CONTROL WORRYING: SEVERAL DAYS
5. BEING SO RESTLESS THAT IT IS HARD TO SIT STILL: NOT AT ALL
1. FEELING NERVOUS, ANXIOUS, OR ON EDGE: NOT AT ALL

## 2024-09-06 DIAGNOSIS — N18.31 CHRONIC RENAL FAILURE, STAGE 3A (HCC): ICD-10-CM

## 2024-09-06 DIAGNOSIS — I10 ESSENTIAL HYPERTENSION: ICD-10-CM

## 2024-09-06 DIAGNOSIS — E78.00 PURE HYPERCHOLESTEROLEMIA: ICD-10-CM

## 2024-09-06 LAB
ALBUMIN SERPL-MCNC: 4.4 G/DL (ref 3.4–5)
ALBUMIN/GLOB SERPL: 2.1 {RATIO} (ref 1.1–2.2)
ALP SERPL-CCNC: 69 U/L (ref 40–129)
ALT SERPL-CCNC: 24 U/L (ref 10–40)
ANION GAP SERPL CALCULATED.3IONS-SCNC: 10 MMOL/L (ref 3–16)
AST SERPL-CCNC: 28 U/L (ref 15–37)
BILIRUB SERPL-MCNC: 0.5 MG/DL (ref 0–1)
BUN SERPL-MCNC: 19 MG/DL (ref 7–20)
CALCIUM SERPL-MCNC: 9.5 MG/DL (ref 8.3–10.6)
CHLORIDE SERPL-SCNC: 105 MMOL/L (ref 99–110)
CHOLEST SERPL-MCNC: 155 MG/DL (ref 0–199)
CO2 SERPL-SCNC: 25 MMOL/L (ref 21–32)
CREAT SERPL-MCNC: 0.9 MG/DL (ref 0.6–1.2)
DEPRECATED RDW RBC AUTO: 13.1 % (ref 12.4–15.4)
GFR SERPLBLD CREATININE-BSD FMLA CKD-EPI: 64 ML/MIN/{1.73_M2}
GLUCOSE SERPL-MCNC: 86 MG/DL (ref 70–99)
HCT VFR BLD AUTO: 42.7 % (ref 36–48)
HDLC SERPL-MCNC: 65 MG/DL (ref 40–60)
HGB BLD-MCNC: 14.4 G/DL (ref 12–16)
LDL CHOLESTEROL: 78 MG/DL
MCH RBC QN AUTO: 31.9 PG (ref 26–34)
MCHC RBC AUTO-ENTMCNC: 33.6 G/DL (ref 31–36)
MCV RBC AUTO: 94.8 FL (ref 80–100)
PLATELET # BLD AUTO: 179 K/UL (ref 135–450)
PMV BLD AUTO: 8.8 FL (ref 5–10.5)
POTASSIUM SERPL-SCNC: 4.1 MMOL/L (ref 3.5–5.1)
PROT SERPL-MCNC: 6.5 G/DL (ref 6.4–8.2)
RBC # BLD AUTO: 4.51 M/UL (ref 4–5.2)
SODIUM SERPL-SCNC: 140 MMOL/L (ref 136–145)
TRIGL SERPL-MCNC: 59 MG/DL (ref 0–150)
TSH SERPL DL<=0.005 MIU/L-ACNC: 1.18 UIU/ML (ref 0.27–4.2)
VLDLC SERPL CALC-MCNC: 12 MG/DL
WBC # BLD AUTO: 4.5 K/UL (ref 4–11)

## 2024-10-02 DIAGNOSIS — G47.00 INSOMNIA, UNSPECIFIED TYPE: ICD-10-CM

## 2024-10-02 RX ORDER — TRAZODONE HYDROCHLORIDE 50 MG/1
50 TABLET, FILM COATED ORAL NIGHTLY
Qty: 30 TABLET | Refills: 0 | Status: SHIPPED | OUTPATIENT
Start: 2024-10-02

## 2024-10-02 NOTE — TELEPHONE ENCOUNTER
Refill Request     CONFIRM preferred pharmacy with the patient.    If Mail Order Rx - Pend for 90 day refill.      Last Seen: Last Seen Department: 9/5/2024  Last Seen by PCP: 9/5/2024    Last Written: 9/5/24 30 with no refills     If no future appointment scheduled:  Review the last OV with PCP and review information for follow-up visit,  Route STAFF MESSAGE with patient name to the  Pool for scheduling with the following information:            -  Timing of next visit           -  Visit type ie Physical, OV, etc           -  Diagnoses/Reason ie. COPD, HTN - Do not use MEDICATION, Follow-up or CHECK UP - Give reason for visit      Next Appointment:   Future Appointments   Date Time Provider Department Center   11/12/2024  2:30 PM Tr Valenzuela MD [facility]   3/6/2025  2:00 PM Wilbert Giles DO [facility]       Message sent to  to schedule appt with patient?  NO      Requested Prescriptions     Pending Prescriptions Disp Refills    traZODone (DESYREL) 50 MG tablet [Pharmacy Med Name: traZODone 50 MG TABLET] 30 tablet 0     Sig: TAKE ONE TABLET BY MOUTH ONCE NIGHTLY

## 2024-10-24 DIAGNOSIS — G47.00 INSOMNIA, UNSPECIFIED TYPE: ICD-10-CM

## 2024-10-24 RX ORDER — TRAZODONE HYDROCHLORIDE 50 MG/1
50 TABLET, FILM COATED ORAL NIGHTLY
Qty: 90 TABLET | Refills: 1 | Status: SHIPPED | OUTPATIENT
Start: 2024-10-24

## 2024-10-24 NOTE — TELEPHONE ENCOUNTER
Patient calling asking for a refill on Trazodone, she is getting low. Also wanted to let provider know this medication is working for her.

## 2024-10-24 NOTE — TELEPHONE ENCOUNTER
Refill Request     CONFIRM preferred pharmacy with the patient.    If Mail Order Rx - Pend for 90 day refill.      Last Seen: Last Seen Department: 9/5/2024  Last Seen by PCP: 9/5/2024    Last Written: 10/2/24 #30 - 0 refills     If no future appointment scheduled:  Review the last OV with PCP and review information for follow-up visit,  Route STAFF MESSAGE with patient name to the  Pool for scheduling with the following information:            -  Timing of next visit           -  Visit type ie Physical, OV, etc           -  Diagnoses/Reason ie. COPD, HTN - Do not use MEDICATION, Follow-up or CHECK UP - Give reason for visit      Next Appointment:   Future Appointments   Date Time Provider Department Center   11/12/2024  2:30 PM Tr Valenzuela MD [facility]   3/6/2025  2:00 PM Wilbert Giles DO [facility]       Message sent to  to schedule appt with patient?  NO      Requested Prescriptions     Pending Prescriptions Disp Refills    traZODone (DESYREL) 50 MG tablet 30 tablet 0     Sig: Take 1 tablet by mouth nightly

## 2024-11-07 DIAGNOSIS — R09.82 POST-NASAL DRAINAGE: ICD-10-CM

## 2024-11-07 RX ORDER — FLUTICASONE PROPIONATE 50 MCG
SPRAY, SUSPENSION (ML) NASAL
Qty: 16 G | Refills: 3 | Status: SHIPPED | OUTPATIENT
Start: 2024-11-07

## 2024-11-07 NOTE — TELEPHONE ENCOUNTER
Refill Request     CONFIRM preferred pharmacy with the patient.    If Mail Order Rx - Pend for 90 day refill.      Last Seen: Last Seen Department: 9/5/2024  Last Seen by PCP: 9/5/2024    Last Written: 9/5/24     If no future appointment scheduled:  Review the last OV with PCP and review information for follow-up visit,  Route STAFF MESSAGE with patient name to the  Pool for scheduling with the following information:            -  Timing of next visit           -  Visit type ie Physical, OV, etc           -  Diagnoses/Reason ie. COPD, HTN - Do not use MEDICATION, Follow-up or CHECK UP - Give reason for visit      Next Appointment:   Future Appointments   Date Time Provider Department Center   11/12/2024  2:30 PM [facility]   3/6/2025  2:00 PM Wilbert Giles, DO [facility]       Message sent to  to schedule appt with patient?  NO      Requested Prescriptions     Pending Prescriptions Disp Refills    fluticasone (FLONASE) 50 MCG/ACT nasal spray [Pharmacy Med Name: FLUTICASONE PROP 50 MCG SPRAY]       Sig: SPRAY 1 SPRAY IN EACH NOSTRIL ONCE DAILY

## 2024-11-12 ENCOUNTER — OFFICE VISIT (OUTPATIENT)
Dept: PULMONOLOGY | Age: 84
End: 2024-11-12
Payer: MEDICARE

## 2024-11-12 VITALS
WEIGHT: 131.8 LBS | DIASTOLIC BLOOD PRESSURE: 60 MMHG | BODY MASS INDEX: 23.35 KG/M2 | OXYGEN SATURATION: 97 % | HEIGHT: 63 IN | HEART RATE: 70 BPM | SYSTOLIC BLOOD PRESSURE: 122 MMHG

## 2024-11-12 DIAGNOSIS — I10 HYPERTENSION, UNSPECIFIED TYPE: ICD-10-CM

## 2024-11-12 DIAGNOSIS — G47.33 OSA (OBSTRUCTIVE SLEEP APNEA): Primary | ICD-10-CM

## 2024-11-12 PROCEDURE — 99214 OFFICE O/P EST MOD 30 MIN: CPT | Performed by: INTERNAL MEDICINE

## 2024-11-12 PROCEDURE — 1159F MED LIST DOCD IN RCRD: CPT | Performed by: INTERNAL MEDICINE

## 2024-11-12 PROCEDURE — 1036F TOBACCO NON-USER: CPT | Performed by: INTERNAL MEDICINE

## 2024-11-12 PROCEDURE — G8484 FLU IMMUNIZE NO ADMIN: HCPCS | Performed by: INTERNAL MEDICINE

## 2024-11-12 PROCEDURE — 1123F ACP DISCUSS/DSCN MKR DOCD: CPT | Performed by: INTERNAL MEDICINE

## 2024-11-12 PROCEDURE — G8420 CALC BMI NORM PARAMETERS: HCPCS | Performed by: INTERNAL MEDICINE

## 2024-11-12 PROCEDURE — 1090F PRES/ABSN URINE INCON ASSESS: CPT | Performed by: INTERNAL MEDICINE

## 2024-11-12 PROCEDURE — G8399 PT W/DXA RESULTS DOCUMENT: HCPCS | Performed by: INTERNAL MEDICINE

## 2024-11-12 PROCEDURE — 3074F SYST BP LT 130 MM HG: CPT | Performed by: INTERNAL MEDICINE

## 2024-11-12 PROCEDURE — 3078F DIAST BP <80 MM HG: CPT | Performed by: INTERNAL MEDICINE

## 2024-11-12 PROCEDURE — G8427 DOCREV CUR MEDS BY ELIG CLIN: HCPCS | Performed by: INTERNAL MEDICINE

## 2024-11-12 ASSESSMENT — SLEEP AND FATIGUE QUESTIONNAIRES
HOW LIKELY ARE YOU TO NOD OFF OR FALL ASLEEP WHILE LYING DOWN TO REST IN THE AFTERNOON WHEN CIRCUMSTANCES PERMIT: WOULD NEVER DOZE
HOW LIKELY ARE YOU TO NOD OFF OR FALL ASLEEP WHEN YOU ARE A PASSENGER IN A CAR FOR AN HOUR WITHOUT A BREAK: WOULD NEVER DOZE
HOW LIKELY ARE YOU TO NOD OFF OR FALL ASLEEP WHILE SITTING INACTIVE IN A PUBLIC PLACE: WOULD NEVER DOZE
HOW LIKELY ARE YOU TO NOD OFF OR FALL ASLEEP IN A CAR, WHILE STOPPED FOR A FEW MINUTES IN TRAFFIC: WOULD NEVER DOZE
HOW LIKELY ARE YOU TO NOD OFF OR FALL ASLEEP WHILE WATCHING TV: WOULD NEVER DOZE
HOW LIKELY ARE YOU TO NOD OFF OR FALL ASLEEP WHILE SITTING QUIETLY AFTER LUNCH WITHOUT ALCOHOL: WOULD NEVER DOZE
HOW LIKELY ARE YOU TO NOD OFF OR FALL ASLEEP WHILE SITTING AND READING: WOULD NEVER DOZE
HOW LIKELY ARE YOU TO NOD OFF OR FALL ASLEEP WHILE SITTING AND TALKING TO SOMEONE: WOULD NEVER DOZE
ESS TOTAL SCORE: 0

## 2024-11-12 NOTE — PROGRESS NOTES
P Pulmonary, Critical Care [facility] Sleep Specialists                                                                  CHIEF COMPLAINT: follow up HOANG        HPI:   Patient hates her CPAP. Strap leaving tu son her face. Uses every night 7-8 hrs/night. Patient is using CPAP 7-9  Using humidifier. Uses full face mask.  No nodding off when driving. Bedtime is 10 pm and rise time is 7 am. Sleep onset is 5 minutes. ESS is 0             From prior visit:   Patient was diagnosed with moderate HOANG 12/18/13. Associated with snoring, witnessed apnea, and hypersomnia. Did not want to get CPAP at that time. Better with her chin strap and when sitting up - 75 degrees due to her back pain. + dry mouth upon awakening.  Feels overall better and wondering if she still has it.  Interesting in knowing whether she has HOANG or not, if she has might consider CPAP therapy. Bedtime 11-11:30 pm and rise time is 7-7:30 am. Sleep onset 20 minutes.  No naps. Old records were reviewed and summarized by me.       Past Medical History:   Diagnosis Date    Asthma     Diverticulosis     DJD (degenerative joint disease)     thoracic, lumbar    Hyperlipidemia     Hyperparathyroidism, unspecified (HCC)     Hypertension     Osteopenia     stopped fosamax 11/2014 after being on for 10years    Pneumonia     Renal artery stenosis (HCC)     Sleep apnea     Spina bifida (HCC)        Past Surgical History:        Procedure Laterality Date    ANGIOPLASTY      rt renal artery stenosis    BREAST REDUCTION SURGERY  1997    bilateral    COLONOSCOPY  11/18/2008    COLONOSCOPY  5/14/13    bx    DILATION AND CURETTAGE OF UTERUS         Allergies:  is allergic to adhesive tape.  Social History:    TOBACCO:   reports that she has never smoked. She has never used smokeless tobacco.  ETOH:   reports no history of alcohol use.      Family History:       Problem Relation Age of Onset    Cancer Mother 81        duodenal    Heart Disease

## 2024-11-12 NOTE — PATIENT INSTRUCTIONS
[facility]                   [facility]                     [address], [address], [place] [address]                  [facility]                   [address], [address], [place] [address]                      Phone: [phone] Fax: [phone]                Dear Sleep Center Patient,     For in-lab testing please, bring with you:  Appropriate nightclothes (pajamas, sweats, etc.), slippers and robe  All medications you will need during you stay, including any breathing medications, nebulizers and metered dose inhalers  Your own toiletries and hairdryer if you wish to shower before you leave  Current photo I.D. and Insurance card  We do not allow any pillows or bed linens from home due to health regulations  -We recommend that you not bring valuables with you to the Sleep Center, as we cannot be responsible for any lost or damaged personal items.  Please be aware that [facility] is a non-smoking facility. There is no smoking allowed anywhere on [facility] property at any time.  Each patient room is a private room with a private bathroom.  The in-lab test itself consist of electrodes and sensors attached to specific areas of your scalp, face, chest and legs. We will also monitor respiratory effort, nasal and oral airflow and oxygen levels. The test will not hurt- it is painless and not invasive in any way.      At home testing when you come to  the rental unit, please bring:   -I.D. and Insurance card  **Please note the Home Sleep Test Units are limited. It is a 1 night rental and must be dropped off the following day to ensure you are not billed a late or replacement fee**    Please refrain from or reduce the use of caffeine and/or alcohol prior to your sleep study and avoid napping the day of your study, as these will affect the accuracy of your test results.  If you are ill the day of your test

## 2024-12-23 ENCOUNTER — HOSPITAL ENCOUNTER (OUTPATIENT)
Dept: SLEEP CENTER | Age: 84
Discharge: HOME | End: 2024-12-25
Payer: MEDICARE

## 2024-12-23 DIAGNOSIS — I10 HYPERTENSION, UNSPECIFIED TYPE: ICD-10-CM

## 2024-12-23 DIAGNOSIS — G47.33 OSA (OBSTRUCTIVE SLEEP APNEA): ICD-10-CM

## 2024-12-23 PROCEDURE — 95811 POLYSOM 6/>YRS CPAP 4/> PARM: CPT

## 2024-12-24 PROBLEM — I10 HYPERTENSION: Status: ACTIVE | Noted: 2024-12-24

## 2025-01-14 ENCOUNTER — TELEPHONE (OUTPATIENT)
Dept: FAMILY MEDICINE CLINIC | Age: 85
End: 2025-01-14

## 2025-01-14 NOTE — TELEPHONE ENCOUNTER
She stated that she has changed over to [employer] and that she can be approved for a benefit prepaid card that will give her an allowance to help with groceries and prescriptions.     She is asking if Dr. Giles has signed this form I have advised that I do not see where this has been completed or received. She stated that she is unable to get this card until PCP signs off that she has a chronic condition and sends it back in.       This is a Benefits prepaid card that she can be approved for when she has a chronic disease- patient stated that she has chronic kidney disease.    Advised patient to reach back out to her insurance company to see if they can refax this form     Patient verbalized understanding.

## 2025-01-29 ENCOUNTER — TELEPHONE (OUTPATIENT)
Dept: FAMILY MEDICINE CLINIC | Age: 85
End: 2025-01-29

## 2025-01-29 ENCOUNTER — TELEMEDICINE (OUTPATIENT)
Dept: FAMILY MEDICINE CLINIC | Age: 85
End: 2025-01-29

## 2025-01-29 DIAGNOSIS — Z00.00 MEDICARE ANNUAL WELLNESS VISIT, SUBSEQUENT: Primary | ICD-10-CM

## 2025-01-29 RX ORDER — M-VIT,TX,IRON,MINS/CALC/FOLIC 27MG-0.4MG
1 TABLET ORAL DAILY
COMMUNITY

## 2025-01-29 SDOH — ECONOMIC STABILITY: FOOD INSECURITY: WITHIN THE PAST 12 MONTHS, YOU WORRIED THAT YOUR FOOD WOULD RUN OUT BEFORE YOU GOT MONEY TO BUY MORE.: NEVER TRUE

## 2025-01-29 SDOH — ECONOMIC STABILITY: FOOD INSECURITY: WITHIN THE PAST 12 MONTHS, THE FOOD YOU BOUGHT JUST DIDN'T LAST AND YOU DIDN'T HAVE MONEY TO GET MORE.: NEVER TRUE

## 2025-01-29 ASSESSMENT — PATIENT HEALTH QUESTIONNAIRE - PHQ9
SUM OF ALL RESPONSES TO PHQ QUESTIONS 1-9: 2
1. LITTLE INTEREST OR PLEASURE IN DOING THINGS: SEVERAL DAYS
2. FEELING DOWN, DEPRESSED OR HOPELESS: SEVERAL DAYS
SUM OF ALL RESPONSES TO PHQ QUESTIONS 1-9: 2
SUM OF ALL RESPONSES TO PHQ9 QUESTIONS 1 & 2: 2

## 2025-01-29 NOTE — PROGRESS NOTES
Medicare Annual Wellness Visit    January Colunga is here for Medicare AWV    Assessment & Plan   Medicare annual wellness visit, subsequent     No follow-ups on file.     Subjective       Patient's complete Health Risk Assessment and screening values have been reviewed and are found in Flowsheets. The following problems were reviewed today and where indicated follow up appointments were made and/or referrals ordered.    Positive Risk Factor Screenings with Interventions:             General HRA Questions:  Select all that apply: (!) Loneliness (since spouse passed)  Interventions - Loneliness:  See AVS for additional education material          Hearing Screen:  Do you or your family notice any trouble with your hearing that hasn't been managed with hearing aids?: (!) Yes (sometimes has tinnitus)    Interventions:  Patient declines any further evaluation or treatment     Safety:  Do you have any tripping hazards - loose or unsecured carpets or rugs?: (!) Yes  Interventions:  See AVS for additional education material                   Objective    Patient-Reported Vitals  Patient-Reported Weight: 122lb  Patient-Reported Height: 5' 3\"     Patient did not monitor blood pressure             Allergies   Allergen Reactions    Adhesive Tape Rash     Prior to Visit Medications    Medication Sig Taking? Authorizing Provider   traZODone (DESYREL) 50 MG tablet Take 1 tablet by mouth nightly Yes Wilbert Giles, DO   simvastatin (ZOCOR) 20 MG tablet Take 1 tablet by mouth every other day Yes Wilbert Giles DO   amLODIPine (NORVASC) 5 MG tablet TAKE 1 TABLET BY MOUTH EVERY MORNING AND TAKE 1 TABLET BY MOUTH EVERY NIGHT AT BEDTIME Yes Wilbert Giles DO   enalapril (VASOTEC) 20 MG tablet TAKE 1 TABLET BY MOUTH TWICE A DAY Yes Wilbert Giles DO   atenolol (TENORMIN) 50 MG tablet 1/2 tab twice daily Yes Wilbert Giles DO   Cholecalciferol 400 UNIT TABS tablet Take 1 tablet by mouth daily Yes Provider, MD Kim

## 2025-01-29 NOTE — TELEPHONE ENCOUNTER
Thank you for the heads up. I do not see anything on her medication list that would be causing this but can discuss further at upcoming appointment

## 2025-01-29 NOTE — TELEPHONE ENCOUNTER
During AWV patient asked if you could review her medication lost to see if anything she is taking may trigger tinnitus.  She thinks may be medication related. She states does not have every day but has had frequently.  She declined offer of ent/audiology consult  She has upcoming appt with you in March declined need for an earlier appt.

## 2025-02-05 DIAGNOSIS — I10 ESSENTIAL HYPERTENSION: ICD-10-CM

## 2025-02-05 RX ORDER — ATENOLOL 50 MG/1
TABLET ORAL
Qty: 90 TABLET | Refills: 3 | Status: SHIPPED | OUTPATIENT
Start: 2025-02-05

## 2025-02-05 RX ORDER — ENALAPRIL MALEATE 20 MG/1
20 TABLET ORAL 2 TIMES DAILY
Qty: 180 TABLET | Refills: 1 | Status: SHIPPED | OUTPATIENT
Start: 2025-02-05

## 2025-02-05 RX ORDER — AMLODIPINE BESYLATE 5 MG/1
TABLET ORAL
Qty: 180 TABLET | Refills: 1 | Status: SHIPPED | OUTPATIENT
Start: 2025-02-05

## 2025-02-05 NOTE — TELEPHONE ENCOUNTER
Refill Request     CONFIRM preferred pharmacy with the patient.    If Mail Order Rx - Pend for 90 day refill.      Last Seen: Last Seen Department: 1/29/2025  Last Seen by PCP: 1/29/2025    Last Written:   Enalapril-08/09/2024 180 tab 1 refills   Atenolol-02/13/2024 90 tab  3 refills   Amlodipine-08/09/2024 180 tab 1 refills     If no future appointment scheduled:  Review the last OV with PCP and review information for follow-up visit,  Route STAFF MESSAGE with patient name to the  Pool for scheduling with the following information:            -  Timing of next visit           -  Visit type ie Physical, OV, etc           -  Diagnoses/Reason ie. COPD, HTN - Do not use MEDICATION, Follow-up or CHECK UP - Give reason for visit      Next Appointment:   Future Appointments   Date Time Provider Department Center   3/6/2025  2:00 PM Wilbert Giles DO [facility]   5/13/2025  2:30 PM Tr Valenzuela MD [facility]       Message sent to  to schedule appt with patient?  NO      Requested Prescriptions     Pending Prescriptions Disp Refills    enalapril (VASOTEC) 20 MG tablet [Pharmacy Med Name: ENALAPRIL MALEATE 20 MG TAB] 180 tablet 1     Sig: TAKE 1 TABLET BY MOUTH 2 TIMES A DAY    atenolol (TENORMIN) 50 MG tablet [Pharmacy Med Name: ATENOLOL 50 MG TABLET] 90 tablet 3     Sig: TAKE 1/2 TABLET BY MOUTH TWICE A DAY    amLODIPine (NORVASC) 5 MG tablet [Pharmacy Med Name: amLODIPine BESYLATE 5 MG TAB] 180 tablet 1     Sig: TAKE 1 TABLET BY MOUTH EVERY MORNING AND TAKE 1 TABLET BY MOUTH EVERY NIGHT AT BEDTIME

## 2025-02-25 LAB
CHOLESTEROL, TOTAL: 150 MG/DL
CHOLESTEROL/HDL RATIO: NORMAL
HDLC SERPL-MCNC: 54 MG/DL (ref 35–70)
LDL CHOLESTEROL: 80
NONHDLC SERPL-MCNC: NORMAL MG/DL
TRIGL SERPL-MCNC: 83 MG/DL
VLDLC SERPL CALC-MCNC: 16 MG/DL

## 2025-02-28 LAB
ALBUMIN: 4.2 G/DL
ALP BLD-CCNC: 78 U/L
ALT SERPL-CCNC: 21 U/L
ANION GAP SERPL CALCULATED.3IONS-SCNC: ABNORMAL MMOL/L
AST SERPL-CCNC: 25 U/L
BILIRUB SERPL-MCNC: 0.7 MG/DL (ref 0.1–1.4)
BUN BLDV-MCNC: 18 MG/DL
CALCIUM SERPL-MCNC: 9.7 MG/DL
CHLORIDE BLD-SCNC: 105 MMOL/L
CO2: 25 MMOL/L
CREAT SERPL-MCNC: 16 MG/DL
GFR, ESTIMATED: 49
GLUCOSE BLD-MCNC: 90 MG/DL
POTASSIUM SERPL-SCNC: 4.2 MMOL/L
SODIUM BLD-SCNC: 141 MMOL/L
TOTAL PROTEIN: 6.3 G/DL (ref 6.4–8.2)

## 2025-03-06 ENCOUNTER — OFFICE VISIT (OUTPATIENT)
Dept: FAMILY MEDICINE CLINIC | Age: 85
End: 2025-03-06
Payer: MEDICARE

## 2025-03-06 VITALS
HEART RATE: 60 BPM | BODY MASS INDEX: 22.15 KG/M2 | DIASTOLIC BLOOD PRESSURE: 80 MMHG | WEIGHT: 125 LBS | HEIGHT: 63 IN | SYSTOLIC BLOOD PRESSURE: 142 MMHG | OXYGEN SATURATION: 99 %

## 2025-03-06 DIAGNOSIS — G47.33 MODERATE OBSTRUCTIVE SLEEP APNEA: ICD-10-CM

## 2025-03-06 DIAGNOSIS — G47.00 INSOMNIA, UNSPECIFIED TYPE: ICD-10-CM

## 2025-03-06 DIAGNOSIS — Z29.11 NEED FOR RSV IMMUNIZATION: ICD-10-CM

## 2025-03-06 DIAGNOSIS — N18.31 CHRONIC RENAL FAILURE, STAGE 3A (HCC): ICD-10-CM

## 2025-03-06 DIAGNOSIS — I10 ESSENTIAL HYPERTENSION: Primary | ICD-10-CM

## 2025-03-06 DIAGNOSIS — E21.0 HYPERPARATHYROIDISM, PRIMARY: ICD-10-CM

## 2025-03-06 PROCEDURE — 3077F SYST BP >= 140 MM HG: CPT | Performed by: STUDENT IN AN ORGANIZED HEALTH CARE EDUCATION/TRAINING PROGRAM

## 2025-03-06 PROCEDURE — 99214 OFFICE O/P EST MOD 30 MIN: CPT | Performed by: STUDENT IN AN ORGANIZED HEALTH CARE EDUCATION/TRAINING PROGRAM

## 2025-03-06 PROCEDURE — 1159F MED LIST DOCD IN RCRD: CPT | Performed by: STUDENT IN AN ORGANIZED HEALTH CARE EDUCATION/TRAINING PROGRAM

## 2025-03-06 PROCEDURE — G2211 COMPLEX E/M VISIT ADD ON: HCPCS | Performed by: STUDENT IN AN ORGANIZED HEALTH CARE EDUCATION/TRAINING PROGRAM

## 2025-03-06 PROCEDURE — 1123F ACP DISCUSS/DSCN MKR DOCD: CPT | Performed by: STUDENT IN AN ORGANIZED HEALTH CARE EDUCATION/TRAINING PROGRAM

## 2025-03-06 PROCEDURE — 3079F DIAST BP 80-89 MM HG: CPT | Performed by: STUDENT IN AN ORGANIZED HEALTH CARE EDUCATION/TRAINING PROGRAM

## 2025-03-06 RX ORDER — TRAZODONE HYDROCHLORIDE 100 MG/1
100 TABLET ORAL NIGHTLY
Qty: 90 TABLET | Refills: 1 | Status: SHIPPED | OUTPATIENT
Start: 2025-03-06

## 2025-03-06 NOTE — PROGRESS NOTES
Medication Sig Dispense Refill    traZODone (DESYREL) 100 MG tablet Take 1 tablet by mouth nightly 90 tablet 1    enalapril (VASOTEC) 20 MG tablet TAKE 1 TABLET BY MOUTH 2 TIMES A  tablet 1    atenolol (TENORMIN) 50 MG tablet TAKE 1/2 TABLET BY MOUTH TWICE A DAY 90 tablet 3    amLODIPine (NORVASC) 5 MG tablet TAKE 1 TABLET BY MOUTH EVERY MORNING AND TAKE 1 TABLET BY MOUTH EVERY NIGHT AT BEDTIME 180 tablet 1    Acetaminophen (TYLENOL 8 HOUR PO) Take by mouth      Multiple Vitamins-Minerals (THERAPEUTIC MULTIVITAMIN-MINERALS) tablet Take 1 tablet by mouth daily      fluticasone (FLONASE) 50 MCG/ACT nasal spray SPRAY 1 SPRAY IN EACH NOSTRIL ONCE DAILY 16 g 3    simvastatin (ZOCOR) 20 MG tablet Take 1 tablet by mouth every other day 90 tablet 1    Cholecalciferol 400 UNIT TABS tablet Take 1 tablet by mouth daily      Hydrocortisone, Perianal, (PROCTO-FLEX) 1 % cream Apply topically 2 times daily. 28 g 1    diclofenac sodium (VOLTAREN) 1 % GEL Apply 4 g topically 4 times daily 100 g 0    Zinc 25 MG TABS Take by mouth      Glucosamine-Chondroit-Vit C-Mn (GLUCOSAMINE CHONDR 1500 COMPLX PO) Take by mouth      naproxen (NAPROSYN) 250 MG tablet Take 1 tablet by mouth 2 times daily (with meals)      Melatonin 5 MG TABS tablet Take 2 tablets by mouth      vitamin E 1000 UNITS capsule Take 4 capsules by mouth daily      Ascorbic Acid (VITAMIN C) 500 MG tablet Take 1 tablet by mouth daily      BL GLUCOSAMINE-CHONDROITIN PO Take  by mouth 2 times daily.      Boswellia-Glucosamine-Vit D (OSTEO BI-FLEX-GLUCOS/5-LOXIN PO) Take by mouth (Patient not taking: Reported on 11/12/2024)      SENSIPAR 30 MG tablet Take 1 tablet by mouth Twice a Week (Patient not taking: Reported on 1/29/2025)      FISH OIL by Does not apply route  (Patient not taking: Reported on 1/29/2025)       No current facility-administered medications for this visit.       Patient's past medical history, surgical history, family history, medications,

## 2025-03-07 LAB
ALBUMIN URINE, EXTERNAL: 87.2
BILIRUBIN, URINE: NEGATIVE
BLOOD, URINE: NEGATIVE
BUN BLDV-MCNC: 17 MG/DL
CALCIUM SERPL-MCNC: 9.6 MG/DL
CHLORIDE BLD-SCNC: 101 MMOL/L
CHOLESTEROL, TOTAL: 161 MG/DL
CHOLESTEROL/HDL RATIO: 2.6
CLARITY, UA: CLEAR
CO2: 24 MMOL/L
COLOR, UA: YELLOW
CREAT SERPL-MCNC: 0.94 MG/DL
CREATININE, URINE, EXTERNAL: 29.6
EGFR: 61
GLUCOSE BLD-MCNC: 81 MG/DL
GLUCOSE URINE: NEGATIVE
HDLC SERPL-MCNC: 60 MG/DL (ref 35–70)
KETONES, URINE: NEGATIVE
LDL CHOLESTEROL: 88
LEUKOCYTE ESTERASE, URINE: ABNORMAL
MICROALBUMIN/CREAT UR: 2.9 MG/G{CREAT}
NITRITE, URINE: NEGATIVE
NONHDLC SERPL-MCNC: NORMAL MG/DL
PH UA: 7.5 (ref 4.5–8)
POTASSIUM SERPL-SCNC: 4.1 MMOL/L
PROTEIN UA: ABNORMAL
SODIUM BLD-SCNC: 139 MMOL/L
SPECIFIC GRAVITY UA: 1.01 (ref 1–1.03)
T4 TOTAL: 1.43
TRIGL SERPL-MCNC: 68 MG/DL
TSH SERPL DL<=0.05 MIU/L-ACNC: 1.24 UIU/ML
URIC ACID: 4
UROBILINOGEN, URINE: ABNORMAL
VLDLC SERPL CALC-MCNC: 13 MG/DL

## 2025-05-13 ENCOUNTER — TELEPHONE (OUTPATIENT)
Dept: FAMILY MEDICINE CLINIC | Age: 85
End: 2025-05-13

## 2025-05-13 ENCOUNTER — OFFICE VISIT (OUTPATIENT)
Dept: PULMONOLOGY | Age: 85
End: 2025-05-13
Payer: MEDICARE

## 2025-05-13 VITALS
SYSTOLIC BLOOD PRESSURE: 120 MMHG | WEIGHT: 134 LBS | RESPIRATION RATE: 16 BRPM | HEIGHT: 63 IN | HEART RATE: 70 BPM | OXYGEN SATURATION: 96 % | DIASTOLIC BLOOD PRESSURE: 76 MMHG | BODY MASS INDEX: 23.74 KG/M2

## 2025-05-13 DIAGNOSIS — I10 HYPERTENSION, UNSPECIFIED TYPE: ICD-10-CM

## 2025-05-13 DIAGNOSIS — G47.33 OBSTRUCTIVE SLEEP APNEA HYPOPNEA, MODERATE: Primary | ICD-10-CM

## 2025-05-13 DIAGNOSIS — G47.33 OSA (OBSTRUCTIVE SLEEP APNEA): ICD-10-CM

## 2025-05-13 PROCEDURE — 99214 OFFICE O/P EST MOD 30 MIN: CPT | Performed by: INTERNAL MEDICINE

## 2025-05-13 PROCEDURE — 3078F DIAST BP <80 MM HG: CPT | Performed by: INTERNAL MEDICINE

## 2025-05-13 PROCEDURE — 1159F MED LIST DOCD IN RCRD: CPT | Performed by: INTERNAL MEDICINE

## 2025-05-13 PROCEDURE — 3074F SYST BP LT 130 MM HG: CPT | Performed by: INTERNAL MEDICINE

## 2025-05-13 PROCEDURE — 1123F ACP DISCUSS/DSCN MKR DOCD: CPT | Performed by: INTERNAL MEDICINE

## 2025-05-13 ASSESSMENT — SLEEP AND FATIGUE QUESTIONNAIRES
HOW LIKELY ARE YOU TO NOD OFF OR FALL ASLEEP WHILE SITTING QUIETLY AFTER LUNCH WITHOUT ALCOHOL: WOULD NEVER DOZE
HOW LIKELY ARE YOU TO NOD OFF OR FALL ASLEEP WHILE LYING DOWN TO REST IN THE AFTERNOON WHEN CIRCUMSTANCES PERMIT: WOULD NEVER DOZE
HOW LIKELY ARE YOU TO NOD OFF OR FALL ASLEEP IN A CAR, WHILE STOPPED FOR A FEW MINUTES IN TRAFFIC: WOULD NEVER DOZE
ESS TOTAL SCORE: 0
HOW LIKELY ARE YOU TO NOD OFF OR FALL ASLEEP WHILE WATCHING TV: WOULD NEVER DOZE
HOW LIKELY ARE YOU TO NOD OFF OR FALL ASLEEP WHEN YOU ARE A PASSENGER IN A CAR FOR AN HOUR WITHOUT A BREAK: WOULD NEVER DOZE
HOW LIKELY ARE YOU TO NOD OFF OR FALL ASLEEP WHILE SITTING AND READING: WOULD NEVER DOZE
HOW LIKELY ARE YOU TO NOD OFF OR FALL ASLEEP WHILE SITTING AND TALKING TO SOMEONE: WOULD NEVER DOZE
HOW LIKELY ARE YOU TO NOD OFF OR FALL ASLEEP WHILE SITTING INACTIVE IN A PUBLIC PLACE: WOULD NEVER DOZE

## 2025-05-13 NOTE — TELEPHONE ENCOUNTER
Patient dropping off Provider Confirmation Form for Special Supplemental Benefits for the Chronically that needs to be filled out by May 30th 2025. Please fax to [phone] when complete. Also please call the patient when faxed and mail the form back to the patient so she has it for her records at home.

## 2025-05-13 NOTE — TELEPHONE ENCOUNTER
Patients has been using OTC eye drops to help with dry eye but patient stated the drops aren't working. Patient would like Dr. Sanchez recommendation on a different OTC eye drop. Please advise

## 2025-05-13 NOTE — PROGRESS NOTES
5/13/2025), Disp: 16 g, Rfl: 3    Boswellia-Glucosamine-Vit D (OSTEO BI-FLEX-GLUCOS/5-LOXIN PO), Take by mouth (Patient not taking: Reported on 5/13/2025), Disp: , Rfl:     Glucosamine-Chondroit-Vit C-Mn (GLUCOSAMINE CHONDR 1500 COMPLX PO), Take by mouth (Patient not taking: Reported on 5/13/2025), Disp: , Rfl:     SENSIPAR 30 MG tablet, Take 1 tablet by mouth Twice a Week (Patient not taking: Reported on 5/13/2025), Disp: , Rfl:     FISH OIL, by Does not apply route  (Patient not taking: Reported on 5/13/2025), Disp: , Rfl:         Objective:   PHYSICAL EXAM:    Blood pressure 120/76, pulse 70, resp. rate 16, height 1.6 m (5' 3\"), weight 60.8 kg (134 lb), SpO2 96%.' on RA  Gen: No distress. BMI of 23.74  Eyes: No sclera icterus. No conjunctival injection.   ENT: No discharge. Pharynx clear. Mallampati class IV.   Neck: Trachea midline. Neck circumference 14\"  Resp: No accessory muscle use. No crackles. No wheezes. No rhonchi. No dullness on percussion. Good air entry.   CV: Regular rate. Regular rhythm. No edema.   Skin: Warm and dry.   M/S: No cyanosis. No clubbing.   Neuro: Awake. Alert. Moves all four extremities.   Psych: Oriented x 3. No anxiety.           DATA reviewed by me:   HST 12/18/2013 AHI 19. Desaturation to 82%  CPAP titration 12/21/21 BiPAP 13/9   BiPAP  12/24/24 14/9 cmH2O       CPAP data 02/14-04/12 2023 reviewed by me. Uses 8-9  hrs/night with 86% compliance and AHI of  12.5. CPAP 12 cmH2O   CPAP data 02/14-04/12 2023 reviewed by me. Uses 7-8  hrs/night with 100% compliance and AHI of  19. 95 th 13 cmH2O. APAP 8-13 cmH2O.   CPAP data 10/12-11/10 2024 reviewed by me. Uses 7-8  hrs/night with 90% compliance and AHI of  17.3. 95 th 15.3 cmH2O. APAP 6-14 cmH2O.   CPAP data 04/12-05/11 2025 reviewed by me. Uses 6-7  hrs/night with 90% compliance and AHI of  12. 95 th 15 cmH2O. APAP 6-14 cmH2O.     Assessment:       Moderate HOANG.  Full face mask. APAP 6-14 cmH2O. Optimal compliance with high residual

## 2025-05-13 NOTE — PATIENT INSTRUCTIONS
SUPPLIER TELEPHONE FAX           ADDRESS   [facility]  [phone] [address] [place] [address]   [facility]       ([facility]) [phone] [address] [place] [address]   [facility] [phone] [address] [place] [address]   [facility] [phone] [address] [facility] [place] [address]  **[facility]**   [facility] [phone]  [phone] [address] [place] [address]   [facility] [phone] or  [phone] [address] [place] [address]  **[facility]**   [facility] [phone] [address] [place] [address]   [employer] [facility] [phone] opt4 opt2 [phone] Fax Order to them and they will forward to DME   [facility] [phone] [address] [place] [address]  **[facility] [address]/[facility] [phone] or  [phone] [address] [place] [place] [address]  **[facility] [employer]   [facility]  Supplies, not DME  No insurance, Cash ONLY [phone]  [facility] [phone]    [employer] (online) [phone] Online   [facility] [phone]  [phone] [address] [place] [address]   [facility]  Oxygen/PAP supplies only [phone] [address] [place] [address]  **[facility]  Oxygen/[phone] [address] [place] [address]   [facility]  Oxygen/[phone] [address] [place] [address]   [facility]  Oxygen/[phone] [address] [address]   [facility]  Oxygen/[phone] [address] [place] [address]   [facility]

## 2025-05-15 ENCOUNTER — TELEPHONE (OUTPATIENT)
Dept: PULMONOLOGY | Age: 85
End: 2025-05-15

## 2025-05-15 NOTE — TELEPHONE ENCOUNTER
Form completed. Also could try pataday antihistamine drops to see if there is an allergic component given this time of year. Otherwise ok to use saline drops

## 2025-05-15 NOTE — TELEPHONE ENCOUNTER
[facility] called requesting OV notes and sleep study. Also verified information on the order recently sent to them. This was all faxed via EPIC

## 2025-05-15 NOTE — TELEPHONE ENCOUNTER
The patient has been notified of this information and all questions answered.    Has the completed forms been faxed and mailed?  If not I can complete that just let me know

## 2025-06-18 ENCOUNTER — TELEPHONE (OUTPATIENT)
Dept: PHARMACY | Facility: CLINIC | Age: 85
End: 2025-06-18

## 2025-06-18 RX ORDER — AMLODIPINE BESYLATE 5 MG/1
TABLET ORAL
Qty: 180 TABLET | Refills: 1 | Status: SHIPPED | OUTPATIENT
Start: 2025-06-18

## 2025-06-18 NOTE — TELEPHONE ENCOUNTER
[facility] CLINICAL PHARMACY: ADHERENCE REVIEW  Identified care gap per Snelling: fills at [facility]: ACE/ARB adherence    Patient also appears to be prescribed: Statin    ASSESSMENT    ACE/ARB ADHERENCE    Insurance Records claims through 6/10/25 (Prior Year PDC = not reported; YTD PDC = FIRST FILL; Potential Fail Date: 25):   ENALAPRIL MALEATE 20 MG TAB last filled on 25 for 90 day supply. Next refill due: 25    Prescribed si tablet/capsule twice daily    Per [facility]: not contacted  1RR RX#:[id number]    BP Readings from Last 3 Encounters:   25 120/76   25 (!) 142/80   24 122/60     Estimated Creatinine Clearance: 38 mL/min (based on SCr of 0.94 mg/dL).  Lab Results   Component Value Date    CREATININE 0.94 2025     Lab Results   Component Value Date    K 4.1 2025       PLAN    The following are interventions that have been identified:   Patient OVERDUE refilling ENALAPRIL MALEATE 20 MG TAB and active on home medication list.     Reached patient to review. Patient will  refills  Gave patient education: verified medication, instructions, and education. Recommended pt call Dr's office to ask about trazodone side effects and alternatives.    Last Visit: 25  Next Visit: 25    [facility] Low[facility]   [facility]  [phone] Option 1     For Pharmacy Admin Tracking Only    Program: [facility]  CPA in place:  No  Recommendation Provided To: Patient/Caregiver: 1 via Telephone and Pharmacy: 1  Intervention Detail: Adherence Monitorin  Intervention Accepted By: Patient/Caregiver: 1 and Pharmacy: 1  Gap Closed?: Yes   Time Spent (min): 10

## 2025-06-18 NOTE — TELEPHONE ENCOUNTER
Refill Request     CONFIRM preferred pharmacy with the patient.    If Mail Order Rx - Pend for 90 day refill.      Last Seen: Last Seen Department: 3/6/2025  Last Seen by PCP: 3/6/2025    Last Written: 02/05/25     If no future appointment scheduled:  Review the last OV with PCP and review information for follow-up visit,  Route STAFF MESSAGE with patient name to the  Pool for scheduling with the following information:            -  Timing of next visit           -  Visit type ie Physical, OV, etc           -  Diagnoses/Reason ie. COPD, HTN - Do not use MEDICATION, Follow-up or CHECK UP - Give reason for visit      Next Appointment:   Future Appointments   Date Time Provider Department Center   9/23/2025  1:30 PM Wilbert Giles DO [facility]   5/26/2026  3:15 PM Tr Valenzuela MD [facility]       Message sent to  to schedule appt with patient?  NO      Requested Prescriptions     Pending Prescriptions Disp Refills    amLODIPine (NORVASC) 5 MG tablet 180 tablet 1     Sig: TAKE 1 TABLET BY MOUTH EVERY MORNING AND TAKE 1 TABLET BY MOUTH EVERY NIGHT AT BEDTIME

## 2025-08-16 DIAGNOSIS — E78.00 PURE HYPERCHOLESTEROLEMIA: ICD-10-CM

## 2025-08-19 RX ORDER — SIMVASTATIN 20 MG
20 TABLET ORAL EVERY OTHER DAY
Qty: 45 TABLET | Refills: 3 | Status: SHIPPED | OUTPATIENT
Start: 2025-08-19

## 2025-08-26 ENCOUNTER — OFFICE VISIT (OUTPATIENT)
Dept: PULMONOLOGY | Age: 85
End: 2025-08-26
Payer: MEDICARE

## 2025-08-26 VITALS
SYSTOLIC BLOOD PRESSURE: 128 MMHG | HEIGHT: 64 IN | HEART RATE: 58 BPM | OXYGEN SATURATION: 99 % | BODY MASS INDEX: 22.3 KG/M2 | DIASTOLIC BLOOD PRESSURE: 70 MMHG | RESPIRATION RATE: 17 BRPM | WEIGHT: 130.6 LBS

## 2025-08-26 DIAGNOSIS — I10 HYPERTENSION, UNSPECIFIED TYPE: ICD-10-CM

## 2025-08-26 DIAGNOSIS — G47.33 OSA (OBSTRUCTIVE SLEEP APNEA): Primary | ICD-10-CM

## 2025-08-26 PROCEDURE — 1159F MED LIST DOCD IN RCRD: CPT | Performed by: INTERNAL MEDICINE

## 2025-08-26 PROCEDURE — 1123F ACP DISCUSS/DSCN MKR DOCD: CPT | Performed by: INTERNAL MEDICINE

## 2025-08-26 PROCEDURE — 3074F SYST BP LT 130 MM HG: CPT | Performed by: INTERNAL MEDICINE

## 2025-08-26 PROCEDURE — 3078F DIAST BP <80 MM HG: CPT | Performed by: INTERNAL MEDICINE

## 2025-08-26 PROCEDURE — 99214 OFFICE O/P EST MOD 30 MIN: CPT | Performed by: INTERNAL MEDICINE

## 2025-08-26 ASSESSMENT — SLEEP AND FATIGUE QUESTIONNAIRES
HOW LIKELY ARE YOU TO NOD OFF OR FALL ASLEEP IN A CAR, WHILE STOPPED FOR A FEW MINUTES IN TRAFFIC: WOULD NEVER DOZE
HOW LIKELY ARE YOU TO NOD OFF OR FALL ASLEEP WHILE LYING DOWN TO REST IN THE AFTERNOON WHEN CIRCUMSTANCES PERMIT: WOULD NEVER DOZE
HOW LIKELY ARE YOU TO NOD OFF OR FALL ASLEEP WHILE WATCHING TV: WOULD NEVER DOZE
ESS TOTAL SCORE: 0
HOW LIKELY ARE YOU TO NOD OFF OR FALL ASLEEP WHILE SITTING AND TALKING TO SOMEONE: WOULD NEVER DOZE
HOW LIKELY ARE YOU TO NOD OFF OR FALL ASLEEP WHILE SITTING QUIETLY AFTER LUNCH WITHOUT ALCOHOL: WOULD NEVER DOZE
HOW LIKELY ARE YOU TO NOD OFF OR FALL ASLEEP WHEN YOU ARE A PASSENGER IN A CAR FOR AN HOUR WITHOUT A BREAK: WOULD NEVER DOZE
HOW LIKELY ARE YOU TO NOD OFF OR FALL ASLEEP WHILE SITTING INACTIVE IN A PUBLIC PLACE: WOULD NEVER DOZE
HOW LIKELY ARE YOU TO NOD OFF OR FALL ASLEEP WHILE SITTING AND READING: WOULD NEVER DOZE

## 2025-08-30 DIAGNOSIS — G47.00 INSOMNIA, UNSPECIFIED TYPE: ICD-10-CM

## 2025-09-02 RX ORDER — TRAZODONE HYDROCHLORIDE 100 MG/1
TABLET ORAL
Qty: 90 TABLET | Refills: 3 | Status: SHIPPED | OUTPATIENT
Start: 2025-09-02